# Patient Record
Sex: MALE | Race: WHITE | Employment: OTHER | URBAN - METROPOLITAN AREA
[De-identification: names, ages, dates, MRNs, and addresses within clinical notes are randomized per-mention and may not be internally consistent; named-entity substitution may affect disease eponyms.]

---

## 2017-04-19 ENCOUNTER — OFFICE VISIT (OUTPATIENT)
Dept: UROLOGY | Facility: CLINIC | Age: 37
End: 2017-04-19

## 2017-04-19 VITALS
OXYGEN SATURATION: 99 % | SYSTOLIC BLOOD PRESSURE: 117 MMHG | WEIGHT: 148 LBS | DIASTOLIC BLOOD PRESSURE: 72 MMHG | HEART RATE: 86 BPM | TEMPERATURE: 96.9 F | BODY MASS INDEX: 23.89 KG/M2

## 2017-04-19 DIAGNOSIS — N41.0 PROSTATITIS, ACUTE: ICD-10-CM

## 2017-04-19 DIAGNOSIS — R10.2 PERINEAL PAIN: Primary | ICD-10-CM

## 2017-04-19 DIAGNOSIS — K59.00 CONSTIPATION, UNSPECIFIED CONSTIPATION TYPE: ICD-10-CM

## 2017-04-19 DIAGNOSIS — R30.0 DYSURIA: ICD-10-CM

## 2017-04-19 DIAGNOSIS — R39.15 URINARY URGENCY: ICD-10-CM

## 2017-04-19 LAB
ALBUMIN UR-MCNC: NEGATIVE MG/DL
APPEARANCE UR: CLEAR
BILIRUB UR QL STRIP: NEGATIVE
COLOR UR AUTO: NORMAL
GLUCOSE UR STRIP-MCNC: NEGATIVE MG/DL
HGB UR QL STRIP: NEGATIVE
KETONES UR STRIP-MCNC: NEGATIVE MG/DL
LEUKOCYTE ESTERASE UR QL STRIP: NEGATIVE
NITRATE UR QL: NEGATIVE
PH UR STRIP: 6.5 PH (ref 5–7)
SP GR UR STRIP: 1 (ref 1–1.03)
URN SPEC COLLECT METH UR: NORMAL
UROBILINOGEN UR STRIP-MCNC: NORMAL MG/DL (ref 0–2)

## 2017-04-19 PROCEDURE — 51798 US URINE CAPACITY MEASURE: CPT | Performed by: PHYSICIAN ASSISTANT

## 2017-04-19 PROCEDURE — 99204 OFFICE O/P NEW MOD 45 MIN: CPT | Mod: 25 | Performed by: PHYSICIAN ASSISTANT

## 2017-04-19 PROCEDURE — 81003 URINALYSIS AUTO W/O SCOPE: CPT | Performed by: PHYSICIAN ASSISTANT

## 2017-04-19 RX ORDER — DOXYCYCLINE 100 MG/1
100 CAPSULE ORAL 2 TIMES DAILY
Qty: 28 CAPSULE | Refills: 1 | Status: SHIPPED | OUTPATIENT
Start: 2017-04-19 | End: 2017-06-21

## 2017-04-19 ASSESSMENT — PAIN SCALES - GENERAL: PAINLEVEL: NO PAIN (0)

## 2017-04-19 NOTE — MR AVS SNAPSHOT
After Visit Summary   4/19/2017    Atilio Wiley    MRN: [id number]           Patient Information     Date Of Birth          1980        Visit Information        Provider Department      4/19/2017 3:30 PM Shira Pack PA-C [facility]        Today's Diagnoses     Urinary urgency    -  1    Dysuria        Prostatitis, acute          Care Instructions    1. Your exam today is consistent with prostatitis (inflammation or infection in the prostate gland) which is the most likely explanation for the symptoms you are having.  2. Start taking the antibiotic (Doxycycline) to treat this. Take it twice a day for 2 weeks. If your symptoms are not better after 2 weeks,  the refill at the pharmacy and take it for another 2 weeks.  3. Ibuprofen (Advil) can also be helpful as it helps with both pain and inflammation. Follow the instructions on the bottle for dosing. This can be purchased over the counter.  4. Continue to take your probiotic or eat a yogurt once daily to protect your healthy intestinal bacteria.  5. We will check your urine for gonorrhea and chlamydia today. Will call in 1-2 days with results.   6. Sitting in a tub of warm water for 10-15 minutes a few times a week can also soothe pain and irritation from prostatitis.    Call or return to clinic if things do not improve with antibiotics.  [phone]          Follow-ups after your visit        Who to contact     If you have questions or need follow up information about today's clinic visit or your schedule please contact [facility] directly at [phone].  Normal or non-critical lab and imaging results will be communicated to you by MyChart, letter or phone within 4 business days after the clinic has received the results. If you do not hear from us within 7 days, please contact the clinic through MyChart or phone. If you have a critical or abnormal lab result, we will notify you by phone as soon  as possible.  Submit refill requests through [employer] or call your pharmacy and they will forward the refill request to us. Please allow 3 business days for your refill to be completed.          Additional Information About Your Visit        [employer] Information     [employer] is an electronic gateway that provides easy, online access to your medical records. With [employer], you can request a clinic appointment, read your test results, renew a prescription or communicate with your care team.     To sign up for [employer] visit the website at [email or web address]   You will be asked to enter the access code listed below, as well as some personal information. Please follow the directions to create your username and password.     Your access code is: [email or web address]  Expires: 2017  4:15 PM     Your access code will  in 90 days. If you need help or a new code, please contact your [facility] or call [phone] for assistance.        Care EveryWhere ID     This is your Care EveryWhere ID. This could be used by other organizations to access your [place] medical records  [id number]        Your Vitals Were     Pulse Temperature Pulse Oximetry BMI (Body Mass Index)          86 96.9  F (36.1  C) (Oral) 99% 23.89 kg/m2         Blood Pressure from Last 3 Encounters:   17 117/72   09 (!) 86/54   08 96/51    Weight from Last 3 Encounters:   17 67.1 kg (148 lb)   09 70 kg (154 lb 6.4 oz)   08 67.1 kg (148 lb)              We Performed the Following     CHLAMYDIA TRACHOMATIS PCR     MEASURE POST-VOID RESIDUAL URINE/BLADDER CAPACITY, US NON-IMAGING     NEISSERIA GONORRHOEA PCR     UA reflex to Microscopic and Culture          Today's Medication Changes          These changes are accurate as of: 17  4:15 PM.  If you have any questions, ask your nurse or doctor.               Start taking these medicines.        Dose/Directions    doxycycline 100 MG  capsule   Commonly known as:  VIBRAMYCIN   Used for:  Prostatitis, acute   Started by:  Shira Pack PA-C        Dose:  100 mg   Take 1 capsule (100 mg) by mouth 2 times daily for 14 days   Quantity:  28 capsule   Refills:  1            Where to get your medicines      These medications were sent to [facility] [address] [facility] - [place], [place] - [address] [place] [address] [address]  [address] [facility] [address]     Phone:  [phone]     doxycycline 100 MG capsule                Primary Care Provider Office Phone # Fax #    DAVID Monahan [facility] [phone]       [facility] [address] [facility] [address]        Thank you!     Thank you for choosing [facility]  for your care. Our goal is always to provide you with excellent care. Hearing back from our patients is one way we can continue to improve our services. Please take a few minutes to complete the written survey that you may receive in the mail after your visit with us. Thank you!             Your Updated Medication List - Protect others around you: Learn how to safely use, store and throw away your medicines at www.disposemymeds.org.          This list is accurate as of: 4/19/17  4:15 PM.  Always use your most recent med list.                   Brand Name Dispense Instructions for use    doxycycline 100 MG capsule    VIBRAMYCIN    28 capsule    Take 1 capsule (100 mg) by mouth 2 times daily for 14 days       MULTIVITAMIN ADULT PO          ZANTAC PO      None Entered

## 2017-04-19 NOTE — PATIENT INSTRUCTIONS
1. Your exam today is consistent with prostatitis (inflammation or infection in the prostate gland) which is the most likely explanation for the symptoms you are having.  2. Start taking the antibiotic (Doxycycline) to treat this. Take it twice a day for 2 weeks. If your symptoms are not better after 2 weeks,  the refill at the pharmacy and take it for another 2 weeks.  3. Ibuprofen (Advil) can also be helpful as it helps with both pain and inflammation. Follow the instructions on the bottle for dosing. This can be purchased over the counter.  4. Continue to take your probiotic or eat a yogurt once daily to protect your healthy intestinal bacteria.  5. We will check your urine for gonorrhea and chlamydia today. Will call in 1-2 days with results.   6. Sitting in a tub of warm water for 10-15 minutes a few times a week can also soothe pain and irritation from prostatitis.    Call or return to clinic if things do not improve with antibiotics.  [phone]

## 2017-04-19 NOTE — PROGRESS NOTES
"CC: perineal pain    HPI: It is a pleasure to see . Atilio Wiley, a very pleasant 36 year old male who presents via self-referral for evaluation of the above. Symptoms have been ongoing for several weeks-months and are progressively worsening. His main complaints include pain/pressure in the perineum and rectal area, discomfort with urination, and some hesitancy. Voids q1-2 hours during the day, nocturia x 1-2. Occasionally, he will have to void again 5-10 minutes later.  He also reports mild urgency and intermittent dysuria. Denies gross hematuria, pyuria, penile discharge, incontinence, intermittency, fevers, chills, N/V, abdominal/back pain, history of UTI's or kidney stones. No new sexual partners but would like to be checked for STD's.     Per chart review in Care Everywhere, patient has a long history of intermittent urinary retention and obstructive voiding symptoms. He was seen by [facility] previously. Reports having a cystoscopy 1 year ago which was \"normal.\" Cannot locate any urology records. The patient does have issues with severe constipation - ongoing since childhood. Sees GI and had a colonoscopy recently with no obvious cause. Also reports he has to catheterize himself about once a year when his urinary system \"gets clogged.\" He did this within the past week - the catheter passes without any difficulty. Has no major neurological or balance issues. No personal or family history of prostate issues. He denies previous groin injury or trauma. No h/o STD's.       Past Medical History:   Diagnosis Date     A-fib (H)      Deviated nasal septum     left sided maxillary facial pain     Past Surgical History:   Procedure Laterality Date     SINUS SURGERY       Current Outpatient Prescriptions   Medication Sig Dispense Refill     Multiple Vitamins-Minerals (MULTIVITAMIN ADULT PO)        doxycycline (VIBRAMYCIN) 100 MG capsule Take 1 capsule (100 mg) by mouth 2 times daily for 14 days 28 capsule 1 "     ZANTAC OR None Entered       No Known Allergies  FAMILY HISTORY: The patient denies history of genitourinary carcinoma.  There is no history of urolithiasis.    SOCIAL HISTORY: The patient does not smoke cigarettes, no EtOH and no illicit drug use.    ROS: A comprehensive 14 point ROS was obtained and was positive for a fib, back pain, constipation, deviated septum and otherwise negative except for that outlined above in the HPI.    PHYSICAL EXAM:   Vitals:    04/19/17 1537   BP: 117/72   BP Location: Left arm   Patient Position: Chair   Cuff Size: Adult Regular   Pulse: 86   Temp: 96.9  F (36.1  C)   TempSrc: Oral   SpO2: 99%   Weight: 67.1 kg (148 lb)     GENERAL: Well groomed/well developed/well nourished male in NAD.  HEENT: EOMI, AT, NC.  SKIN: Warm to touch, dry.  No visible rashes or lesions.  RESP: No increased respiratory effort.    MS: Full ROM in extremities.  : Deferred.  VICKEY:     Good sphincter tone, smooth rectal mucosa.     Mildly enlarged prostate that is smooth to palpation without nodules, mass, or asymmetry. Moderate tenderness and some bogginess to palpation.   NEURO: Alert and oriented x 3.  PSYCH: Normal mood and affect, pleasant and agreeable during interview and exam.    PVR: 0 mL as measured by ultrasound    Urine dip today completely negative.     IMAGING:   CT A/P w/contrast   5/19/16   (per records in Care Everywhere)  Impression:  Moderate amount of stool in the colon.  No other significant findings.    ASSESSMENT/PLAN:  Mr. Atilio Wiley is a 36 year old male with chronic constipation, intermittent urinary retention  (self caths about once per year), and perineal pain/pressue with some obstructive voiding symptoms which suspect are secondary to acute prostatitis. VICKEY today reveals a quite tender and slightly boggy prostate gland. Patient has reportedly had cystoscopy with [facility] within the past year which was normal. No records available - would be helpful to have  records faxed as he is wishing to transfer his urology care.  -Will treat for presumed prostatitis with 2 weeks of Doxycycline. 1 refill available if symptoms not completely resolved.  - Encouraged warm Sitz baths, NSAIDS PRN, and avoidance of aggravating activities and known bladder irritants.   - Stressed importance of preventing constipation as this can worsen urinary retention and obstructive symptoms. Increase fluids, fiber, and use Miralax or stool softeners PRN.  - Instructed to take a daily probiotic or eat yogurt to prevent disruption to normal GI bee while taking antibiotics.  - Will also send urine for gonorrhea, chlamydia today    Follow up if symptoms do not improve with the above. Urethral stricture remains on the differential - could consider RUG/VCUG to further evaluate. Warning signs/ER precautions discussed.     I have enjoyed participating in the medical care of this very pleasant patient.  Please don't hesitate to contact me with any questions or concerns.     Shira Pack PA-C  Department of Urology

## 2017-04-19 NOTE — NURSING NOTE
"Atilio Wiley's goals for this visit include: discuss urinary urgency and frequency prevention  He requests these members of his care team be copied on today's visit information:     PCP: Chana Poon    Referring Provider:  Referred Self, MD  No address on file    Chief Complaint   Patient presents with     Urinary Problem     Urgency/frequency       Initial /72 (BP Location: Left arm, Patient Position: Chair, Cuff Size: Adult Regular)  Pulse 86  Temp 96.9  F (36.1  C) (Oral)  Wt 67.1 kg (148 lb)  SpO2 99%  BMI 23.89 kg/m2 Estimated body mass index is 23.89 kg/(m^2) as calculated from the following:    Height as of 2/19/09: 1.676 m (5' 6\").    Weight as of this encounter: 67.1 kg (148 lb).  Medication Reconciliation: complete    Do you need any medication refills at today's visit? No    Minna Oneill LPN    "

## 2017-04-20 ENCOUNTER — TELEPHONE (OUTPATIENT)
Dept: UROLOGY | Facility: CLINIC | Age: 37
End: 2017-04-20

## 2017-04-20 NOTE — TELEPHONE ENCOUNTER
Discussed labs with Shira Pack PA-C who reports that patient is being treated with doxycycline for presumed prostatitis and gonorrhea and chlamydia labs can be cancelled.     Kaylah Roman  General Surgery - Urology RN

## 2017-04-20 NOTE — TELEPHONE ENCOUNTER
"Called and spoke to lab who reports that the lab order for gonnorhea and chlamydia shows that it is completed, but the sample was not sent as a dirty catch urine is needed.     Called and spoke to patient who is aware of the above information. Informed patient that it is recommended for him to leave a dirty catch urine sample at any [place] lab at his earliest convenience. Patient stated, \"I wish they would have told me that before I left.\" Offered to fax lab orders to a clinic near patient, but patient declined. Patients stated, \"Shira told me that the antibiotic would cover it if it ended up being positive. I will just skip those two labs and save some money.\" Patient plans to take the doxycycline as prescribed. Informed patient that writer would discuss with Shira Pack PA-C and contact him with any changes in plan of care. Patient will call with any questions or concerns.    Kaylah Roman  General Surgery - Urology RN    "

## 2017-05-03 ENCOUNTER — OFFICE VISIT (OUTPATIENT)
Dept: PEDIATRICS | Facility: CLINIC | Age: 37
End: 2017-05-03

## 2017-05-03 VITALS
HEIGHT: 67 IN | BODY MASS INDEX: 22.62 KG/M2 | SYSTOLIC BLOOD PRESSURE: 119 MMHG | TEMPERATURE: 98.2 F | OXYGEN SATURATION: 99 % | HEART RATE: 69 BPM | WEIGHT: 144.1 LBS | DIASTOLIC BLOOD PRESSURE: 65 MMHG

## 2017-05-03 DIAGNOSIS — K21.9 GASTROESOPHAGEAL REFLUX DISEASE WITHOUT ESOPHAGITIS: Primary | ICD-10-CM

## 2017-05-03 PROCEDURE — 99213 OFFICE O/P EST LOW 20 MIN: CPT | Performed by: INTERNAL MEDICINE

## 2017-05-03 NOTE — PROGRESS NOTES
"  SUBJECTIVE:                                                    Atilio Wiley is a 36 year old male who presents to clinic today for the following health issues:      Pt. Is present today for stomach pain. Pt. States this has been ongoing for a while.  Pt. States he always has stomach pain but has a concern for a artery in stomach.  Pt. States he would also like to discuss acid reflex concerns. Pt. Has had acid problems x 10 years.    HPI: This patient recently had a cardioversion completed for atrial fibrillation. Also has recently been started on doxycycline for prostatitis. Has chronic problems with GERD which he says is not ideally controlled on Zantac    PMH:   Patient Active Problem List   Diagnosis     A-fib (H)     CARDIOVASCULAR SCREENING; LDL GOAL LESS THAN 160     Past Surgical History:   Procedure Laterality Date     SINUS SURGERY         Social History   Substance Use Topics     Smoking status: Never Smoker     Smokeless tobacco: Not on file     Alcohol use No     History reviewed. No pertinent family history.      Current Outpatient Prescriptions   Medication Sig Dispense Refill     doxycycline (VIBRAMYCIN) 100 MG capsule Take 1 capsule (100 mg) by mouth 2 times daily for 14 days 28 capsule 1     Multiple Vitamins-Minerals (MULTIVITAMIN ADULT PO) Reported on 5/3/2017       ZANTAC OR Reported on 5/3/2017       No Known Allergies    ROS:  C: NEGATIVE for fever, chills, change in weight  R: NEGATIVE for significant cough or SOB  CV: NEGATIVE for chest pain or peripheral edema    OBJECTIVE:                                                    /65 (BP Location: Right arm, Patient Position: Chair, Cuff Size: Adult Regular)  Pulse 69  Temp 98.2  F (36.8  C) (Temporal)  Ht 5' 6.95\" (1.701 m)  Wt 144 lb 1.6 oz (65.4 kg)  SpO2 99%  BMI 22.6 kg/m2  Body mass index is 22.6 kg/(m^2).     GENERAL: healthy, alert and no distress  RESP: lungs clear to auscultation - no rales, rhonchi or wheezes  CV: regular " rates and rhythm, normal S1 S2, no S3 or S4 and no murmur, click or rub  ABDOMEN: soft, nontender, no hepatosplenomegaly, no masses and bowel sounds normal    Diagnostic Test Results:  none      ASSESSMENT/PLAN:                                                    1. Gastroesophageal reflux disease without esophagitis      Stop the Zantac and start the Prilosec. He has an as needed to follow up with urology for prostatitis and sees a cardiologist next week for follow-up of his cardioversion.      - omeprazole (PRILOSEC) 20 MG CR capsule; Take 1 capsule (20 mg) by mouth daily  Dispense: 30 capsule; Refill: 1        Will call or return to clinic if worsening or symptoms not improving as discussed.  See Patient Instructions      Eros Romero MD  [facility]

## 2017-05-03 NOTE — MR AVS SNAPSHOT
After Visit Summary   5/3/2017    Atilio Wiley    MRN: [id number]           Patient Information     Date Of Birth          1980        Visit Information        Provider Department      5/3/2017 2:40 PM Eros Romero MD [facility]        Today's Diagnoses     Gastroesophageal reflux disease without esophagitis    -  1      Care Instructions    Stop the Zantac  Start OMEPRAZOLE  one every am one half hour before breakfast.  Tips to Control Acid Reflux  To control acid reflux, you ll need to make some basic diet and lifestyle changes. The simple steps outlined below may be all you ll need to relieve discomfort.  Watch What You Eat      Avoid fatty foods and spicy foods.    Eat fewer acidic foods, such as citrus and tomato-based foods. These can increase symptoms.    Limit drinking alcohol, caffeine, and fizzy beverages. All increase acid reflux.    Try limiting chocolate, peppermint, and spearmint. These can worsen acid reflux in some people.  Watch When You Eat    Avoid lying down for 3 hours after eating.    Do not snack before going to bed.  Raise Your Head    Raising your head and upper body by 4 inches to 6 inches helps limit reflux when you re lying down. Put blocks under the head of the bed frame to raise it.  Other Changes    Lose weight, if you need to.    Don t work out near bedtime.    Avoid tight-fitting clothes.    Limit aspirin and ibuprofen.    Stop smoking.     2878-9271 The "[employer]". [address] [place] [address], [place] [id number]. All rights reserved. This information is not intended as a substitute for professional medical care. Always follow your healthcare professional's instructions.        Medications for Acid Reflux  Your health care provider has told you that you have acid reflux. This is a condition that causes stomach acid to wash up into your throat. For most people, acid reflux is troubling but not dangerous. However, left untreated, acid  reflux sometimes damages the esophagus. Medications can help control acid reflux and limit your risk of future problems.  Medications for Acid Reflux  Your health care provider may prescribe medication to help treat your acid reflux. Medication will be based on your symptoms and the results of any tests. Your health care provider will explain how to take your medication. You will also be told about possible side effects.  Reducing Stomach Acid  Your doctor may suggest antacids that you can buy over the counter. Antacids can give fast relief. Or you may be told to take a type of medication called H2 blockers. These are available over the counter and by prescription (for higher doses).  Blocking Stomach Acid  In more severe cases, your doctor may suggest stronger medications such as proton pump inhibitors (PPIs). These keep the stomach from making acid. They are often prescribed for long-term use.  Other Medications  If medications to reduce or block stomach acid don t work, you may be switched to another type of medication that helps the stomach empty better.    4038-4820 The [employer]. [address] [place] [address]. All rights reserved. This information is not intended as a substitute for professional medical care. Always follow your healthcare professional's instructions.              Follow-ups after your visit        Who to contact     If you have questions or need follow up information about today's clinic visit or your schedule please contact [facility] directly at [phone].  Normal or non-critical lab and imaging results will be communicated to you by MyChart, letter or phone within 4 business days after the clinic has received the results. If you do not hear from us within 7 days, please contact the clinic through MyChart or phone. If you have a critical or abnormal lab result, we will notify you by phone as soon as possible.  Submit refill requests through  "[employer] or call your pharmacy and they will forward the refill request to us. Please allow 3 business days for your refill to be completed.          Additional Information About Your Visit        [employer] Information     [employer] is an electronic gateway that provides easy, online access to your medical records. With [employer], you can request a clinic appointment, read your test results, renew a prescription or communicate with your care team.     To sign up for [employer] visit the website at [email or web address]   You will be asked to enter the access code listed below, as well as some personal information. Please follow the directions to create your username and password.     Your access code is: [email or web address]  Expires: 2017  4:15 PM     Your access code will  in 90 days. If you need help or a new code, please contact your [facility] or call [phone] for assistance.        Care EveryWhere ID     This is your Care EveryWhere ID. This could be used by other organizations to access your [place] medical records  [id number]        Your Vitals Were     Pulse Temperature Height Pulse Oximetry BMI (Body Mass Index)       69 98.2  F (36.8  C) (Temporal) 5' 6.95\" (1.701 m) 99% 22.6 kg/m2        Blood Pressure from Last 3 Encounters:   17 119/65   17 117/72   09 (!) 86/54    Weight from Last 3 Encounters:   17 144 lb 1.6 oz (65.4 kg)   17 148 lb (67.1 kg)   09 154 lb 6.4 oz (70 kg)              Today, you had the following     No orders found for display         Today's Medication Changes          These changes are accurate as of: 5/3/17  3:00 PM.  If you have any questions, ask your nurse or doctor.               Start taking these medicines.        Dose/Directions    omeprazole 20 MG CR capsule   Commonly known as:  priLOSEC   Used for:  Gastroesophageal reflux disease without esophagitis   Started by:  Eros Romero MD        " Dose:  20 mg   Take 1 capsule (20 mg) by mouth daily   Quantity:  30 capsule   Refills:  1            Where to get your medicines      These medications were sent to [place] [address] [facility] - [place], [place] - [address] [facility] [address]  [address] [place] [address] [facility] [address]     Phone:  [phone]     omeprazole 20 MG CR capsule                Primary Care Provider Office Phone # Fax #    Chana TAVERAS DAVID Poon [facility] [phone]       [facility] [address] [facility] [address]        Thank you!     Thank you for choosing [facility]  for your care. Our goal is always to provide you with excellent care. Hearing back from our patients is one way we can continue to improve our services. Please take a few minutes to complete the written survey that you may receive in the mail after your visit with us. Thank you!             Your Updated Medication List - Protect others around you: Learn how to safely use, store and throw away your medicines at www.disposemymeds.org.          This list is accurate as of: 5/3/17  3:00 PM.  Always use your most recent med list.                   Brand Name Dispense Instructions for use    doxycycline 100 MG capsule    VIBRAMYCIN    28 capsule    Take 1 capsule (100 mg) by mouth 2 times daily for 14 days       MULTIVITAMIN ADULT PO      Reported on 5/3/2017       omeprazole 20 MG CR capsule    priLOSEC    30 capsule    Take 1 capsule (20 mg) by mouth daily       ZANTAC PO      Reported on 5/3/2017

## 2017-05-03 NOTE — PATIENT INSTRUCTIONS
Stop the Zantac  Start OMEPRAZOLE  one every am one half hour before breakfast.  Tips to Control Acid Reflux  To control acid reflux, you ll need to make some basic diet and lifestyle changes. The simple steps outlined below may be all you ll need to relieve discomfort.  Watch What You Eat      Avoid fatty foods and spicy foods.    Eat fewer acidic foods, such as citrus and tomato-based foods. These can increase symptoms.    Limit drinking alcohol, caffeine, and fizzy beverages. All increase acid reflux.    Try limiting chocolate, peppermint, and spearmint. These can worsen acid reflux in some people.  Watch When You Eat    Avoid lying down for 3 hours after eating.    Do not snack before going to bed.  Raise Your Head    Raising your head and upper body by 4 inches to 6 inches helps limit reflux when you re lying down. Put blocks under the head of the bed frame to raise it.  Other Changes    Lose weight, if you need to.    Don t work out near bedtime.    Avoid tight-fitting clothes.    Limit aspirin and ibuprofen.    Stop smoking.     9178-1672 [employer]. [address] [place] [address]. All rights reserved. This information is not intended as a substitute for professional medical care. Always follow your healthcare professional's instructions.        Medications for Acid Reflux  Your health care provider has told you that you have acid reflux. This is a condition that causes stomach acid to wash up into your throat. For most people, acid reflux is troubling but not dangerous. However, left untreated, acid reflux sometimes damages the esophagus. Medications can help control acid reflux and limit your risk of future problems.  Medications for Acid Reflux  Your health care provider may prescribe medication to help treat your acid reflux. Medication will be based on your symptoms and the results of any tests. Your health care provider will explain how to take your medication. You will also be  told about possible side effects.  Reducing Stomach Acid  Your doctor may suggest antacids that you can buy over the counter. Antacids can give fast relief. Or you may be told to take a type of medication called H2 blockers. These are available over the counter and by prescription (for higher doses).  Blocking Stomach Acid  In more severe cases, your doctor may suggest stronger medications such as proton pump inhibitors (PPIs). These keep the stomach from making acid. They are often prescribed for long-term use.  Other Medications  If medications to reduce or block stomach acid don t work, you may be switched to another type of medication that helps the stomach empty better.    8129-4628 [employer]. [address] [place] [address], [place] [address]. All rights reserved. This information is not intended as a substitute for professional medical care. Always follow your healthcare professional's instructions.

## 2017-06-20 ENCOUNTER — TELEPHONE (OUTPATIENT)
Dept: UROLOGY | Facility: CLINIC | Age: 37
End: 2017-06-20

## 2017-06-20 DIAGNOSIS — N41.0 PROSTATITIS, ACUTE: ICD-10-CM

## 2017-06-20 NOTE — TELEPHONE ENCOUNTER
[facility] Call Center    Phone Message    Name of Caller: Atilio    Phone Number: Cell number on file:    Telephone Information:   Mobile [phone]       Best time to return call: ANY    May a detailed message be left on voicemail: yes    Relation to patient: Self    Reason for Call: Medication that was ordered for him worked well, but symptoms are back now, wants to know next step. Please call to discuss    Action Taken: Message routed to:  Adult Clinics: Urology p [id number]

## 2017-06-21 RX ORDER — DOXYCYCLINE 100 MG/1
100 CAPSULE ORAL 2 TIMES DAILY
Qty: 60 CAPSULE | Refills: 0 | Status: SHIPPED | OUTPATIENT
Start: 2017-06-21 | End: 2017-07-21

## 2017-06-21 NOTE — TELEPHONE ENCOUNTER
"Returned call to patient who reports that he completed 1 months worth of doxycycline medication. Patient reports that the urinary frequency has improved. Patient stated, \"The prostate pain improved while I was on the antibiotics, but now after being off of them for the past month, the pain is back the same.\" Informed patient that a message would be sent to Shira Pack PA-C to review and give recommendations. Informed patient that he would be contacted with recommendations. Patient verbalized understanding and was comfortable with plan.    Kaylah Roman RN, BSN  [facility]  Urology/General Surgery    "

## 2017-06-21 NOTE — TELEPHONE ENCOUNTER
Will try one more course of Doxycycline as this improved symptoms previously - possible that treatment duration was not long enough. If not improved after second course, patient needs to follow up in clinic.    Shira Pack PA-C  [facility] Urology

## 2017-06-21 NOTE — TELEPHONE ENCOUNTER
"Received message from Shira Pack PA-C with new order for doxycycline 100 mg BID for 1 month. Per Shira Pack PA-C, patient to return to clinic if symptoms have not improved after that.    Called and spoke to patient who is aware of the above information. Doxycycline 100 mg BID for 1 month ordered per Shira Pack PA-C. Prescription sent to [facility] in [facility] in [place] per patient request. Patient stated, \"When I was taking the doxycycline before, I was also taking a few other new medications and was working outside with brush, but I ended up getting a rash on my hands. Could that be from the doxycycline? I am just trying to narrow it down.\" Informed patient that the rash could be from multiple factors such as medications or from contact. Patient reports that the rash went away soon after and patient was taking the doxycycline at that time and after. Informed patient to call if rash appears again after resuming the doxycycline. Patient verbalized understanding.    Kaylah Roman RN, BSN  [facility]  Urology/General Surgery      "

## 2017-10-30 ENCOUNTER — TELEPHONE (OUTPATIENT)
Dept: UROLOGY | Facility: CLINIC | Age: 37
End: 2017-10-30

## 2017-10-30 NOTE — TELEPHONE ENCOUNTER
Called and spoke to patient regarding message below. Informed patient that per notes, plan was for antibiotics and the return to clinic if symptoms have not improved (see 6/20/17 telephone encounter.) Patient would like to schedule a follow up visit. Appointment with Shira Pack PA-C scheduled for 11/10/17 at 4:00pm.     Kaylah Roman RN, BSN

## 2017-11-10 ENCOUNTER — OFFICE VISIT (OUTPATIENT)
Dept: UROLOGY | Facility: CLINIC | Age: 37
End: 2017-11-10

## 2017-11-10 VITALS
WEIGHT: 149.5 LBS | OXYGEN SATURATION: 99 % | BODY MASS INDEX: 23.45 KG/M2 | SYSTOLIC BLOOD PRESSURE: 96 MMHG | HEART RATE: 55 BPM | TEMPERATURE: 96.7 F | DIASTOLIC BLOOD PRESSURE: 61 MMHG

## 2017-11-10 DIAGNOSIS — R39.11 URINARY HESITANCY: ICD-10-CM

## 2017-11-10 DIAGNOSIS — K62.89 RECTAL PAIN: Primary | ICD-10-CM

## 2017-11-10 DIAGNOSIS — R10.2 PERINEAL PAIN: ICD-10-CM

## 2017-11-10 DIAGNOSIS — K59.00 CONSTIPATION, UNSPECIFIED CONSTIPATION TYPE: ICD-10-CM

## 2017-11-10 PROCEDURE — 99214 OFFICE O/P EST MOD 30 MIN: CPT | Performed by: PHYSICIAN ASSISTANT

## 2017-11-10 NOTE — MR AVS SNAPSHOT
After Visit Summary   11/10/2017    Atilio Wiley    MRN: [id number]           Patient Information     Date Of Birth          1980        Visit Information        Provider Department      11/10/2017 4:00 PM Shira Pack PA-C [facility]        Today's Diagnoses     Rectal pain    -  1       Follow-ups after your visit        Additional Services     COLORECTAL SURGERY REFERRAL       Your provider has referred you to: [facility]: Colon and Rectal Surgery [facility] [phone]   [email or web address]    Referral Reason(s): Consult  Special Concerns:  This referral is: Elective (week +)  It is OK to leave a message on patient's voicemail.    Please be aware that coverage of these services is subject to the terms and limitations of your health insurance plan.  Call member services at your health plan with any benefit or coverage questions.      Please bring the following with you to your appointment:    (1) Any X-Rays, CTs or MRIs which have been performed.  Contact the facility where they were done to arrange for  prior to your scheduled appointment.    (2) List of current medications  (3) This referral request   (4) Any documents/labs given to you for this referral                  Your next 10 appointments already scheduled     Nov 21, 2017  2:45 PM CST   (Arrive by 2:30 PM)   New Patient Visit with DAVID Francis CNP   [facility] Colon and Rectal Surgery ([facility])    [address] [place] [id number] [phone]              Who to contact     If you have questions or need follow up information about today's clinic visit or your schedule please contact [facility] directly at [phone].  Normal or non-critical lab and imaging results will be communicated to you by MyChart, letter or phone within 4 business days after the  clinic has received the results. If you do not hear from us within 7 days, please contact the clinic through [employer] or phone. If you have a critical or abnormal lab result, we will notify you by phone as soon as possible.  Submit refill requests through [employer] or call your pharmacy and they will forward the refill request to us. Please allow 3 business days for your refill to be completed.          Additional Information About Your Visit        [employer] Information     [employer] is an electronic gateway that provides easy, online access to your medical records. With [employer], you can request a clinic appointment, read your test results, renew a prescription or communicate with your care team.     To sign up for [employer] visit the website at [email or web address]   You will be asked to enter the access code listed below, as well as some personal information. Please follow the directions to create your username and password.     Your access code is: [email or web address]  Expires: 2018  4:38 PM     Your access code will  in 90 days. If you need help or a new code, please contact your [facility] or call [phone] for assistance.        Care EveryWhere ID     This is your Care EveryWhere ID. This could be used by other organizations to access your [place] medical records  [id number]        Your Vitals Were     Pulse Temperature Pulse Oximetry BMI (Body Mass Index)          55 96.7  F (35.9  C) (Oral) 99% 23.45 kg/m2         Blood Pressure from Last 3 Encounters:   11/10/17 96/61   17 119/65   17 117/72    Weight from Last 3 Encounters:   11/10/17 67.8 kg (149 lb 8 oz)   17 65.4 kg (144 lb 1.6 oz)   17 67.1 kg (148 lb)              We Performed the Following     COLORECTAL SURGERY REFERRAL        Primary Care Provider Office Phone # Fax #    DAVID Monahan [facility] [phone] [address]  [facility] [address]        Equal Access to  Services     [facility]: Hadii aad ku hadbethanyjosh Orozco, waedieda luqadaha, qaybta kaalmaabbi valle, delisa borjas. So [facility] [phone].    ATENCIÓN: Si habla español, tiene a farias disposición servicios gratuitos de asistencia lingüística. Llame al [phone].    We comply with applicable federal civil rights laws and [place] laws. We do not discriminate on the basis of race, color, national origin, age, disability, sex, sexual orientation, or gender identity.            Thank you!     Thank you for choosing [facility]  for your care. Our goal is always to provide you with excellent care. Hearing back from our patients is one way we can continue to improve our services. Please take a few minutes to complete the written survey that you may receive in the mail after your visit with us. Thank you!             Your Updated Medication List - Protect others around you: Learn how to safely use, store and throw away your medicines at www.disposemymeds.org.          This list is accurate as of: 11/10/17  4:38 PM.  Always use your most recent med list.                   Brand Name Dispense Instructions for use Diagnosis    MULTIVITAMIN ADULT PO      Reported on 5/3/2017        omeprazole 20 MG CR capsule    priLOSEC    30 capsule    Take 1 capsule (20 mg) by mouth daily    Gastroesophageal reflux disease without esophagitis       ZANTAC PO      Reported on 5/3/2017

## 2017-11-10 NOTE — NURSING NOTE
"Atilio Wiley's goals for this visit include:   Chief Complaint   Patient presents with     RECHECK     Perinial pain       He requests these members of his care team be copied on today's visit information: No    PCP: Chana Poon    Referring Provider:  No referring provider defined for this encounter.    Chief Complaint   Patient presents with     RECHECK     Perinial pain       Initial BP 96/61 (BP Location: Left arm, Patient Position: Sitting, Cuff Size: Adult Regular)  Pulse 55  Temp 96.7  F (35.9  C) (Oral)  Wt 67.8 kg (149 lb 8 oz)  SpO2 99%  BMI 23.45 kg/m2 Estimated body mass index is 23.45 kg/(m^2) as calculated from the following:    Height as of 5/3/17: 1.701 m (5' 6.95\").    Weight as of this encounter: 67.8 kg (149 lb 8 oz).  Medication Reconciliation: complete    Do you need any medication refills at today's visit? No    "

## 2017-11-12 NOTE — PROGRESS NOTES
UROLOGY OFFICE VISIT - FOLLOW UP    REASON FOR VISIT: follow up perineal/rectal pain    HPI: Mr. Atilio Wiley is a 37 year old male who returns to the urology clinic for follow up of perineal/rectal pain. Seen in initial consultation on 4/19/17 - please see consult note from this date for full history. In brief, he has a long history of rectal vs perineal pain/pressure as well as significant constipation and some obstructive voiding symptoms with intermittent urinary retention, ongoing for the past 10-15 years. [facility] with reportedly normal cystoscopy in 2016. Has also seen GI with recent normal colonoscopy. He apparently has to self catheterize about once per year when he goes into spontaneous urinary retention. Last time he had to do this was in April 2017.     VICKEY at last office visit had revealed some questionable tenderness of the prostate gland so he was treated for a possible prostatitis with a course of Doxycycline. Returns to clinic today and reports antibiotics did not help. His symptoms are essentially unchanged. His main complaints today include rectal pain and pain in in left lower abdomen. Last BM was earlier today. He states that after he has a BM, he will be unable to urinate for several minutes but then is eventually able to void again. He denies any dysuria, gross hematuria, pyuria, penile discharge, concern for STD's, hematochezia, melena, or h/o anal fissures.    PEx  BP 96/61 (BP Location: Left arm, Patient Position: Sitting, Cuff Size: Adult Regular)  Pulse 55  Temp 96.7  F (35.9  C) (Oral)  Wt 67.8 kg (149 lb 8 oz)  SpO2 99%  BMI 23.45 kg/m2  GEN: well-appearing male in NAD  RESP: no increased respiratory effort    PVR: 0 cc (measured at last office visit)    Color Urine (no units)   Date Value   04/19/2017 Light Yellow     Appearance Urine (no units)   Date Value   04/19/2017 Clear     Glucose Urine (mg/dL)   Date Value   04/19/2017 Negative     Bilirubin  Urine (no units)   Date Value   04/19/2017 Negative     Ketones Urine (mg/dL)   Date Value   04/19/2017 Negative     Specific Gravity Urine (no units)   Date Value   04/19/2017 1.005     pH Urine (pH)   Date Value   04/19/2017 6.5     Protein Albumin Urine (mg/dL)   Date Value   04/19/2017 Negative     Nitrite Urine (no units)   Date Value   04/19/2017 Negative     Leukocyte Esterase Urine (no units)   Date Value   04/19/2017 Negative       IMAGING:   CT A/P w/contrast   5/19/16   (per records in Care Everywhere)  Impression:  Moderate amount of stool in the colon.  No other significant findings.      ASSESSMENT/PLAN  37 year old male with chronic constipation, rectal vs perineal pain, and obstructive voiding symptoms with infrequent episodes of spontaneous urinary retention for which he self-catheterizes as needed (occurs maybe once per year).     Has had normal cystoscopy and colonoscopy within the past 1-2 years. Treatment for prostatitis did not improve/change symptoms. UA and PVR at last office visit were normal.     His main complaints today are more rectal-focused (pain and constipation) rather than urinary. Possible he has some pelvic floor dysfunction for which he may benefit from pelvic floor testing.     We discussed potential management options including trial of an alpha blocker medication such a tamsulosin, pelvic floor physical therapy, scheduling urodynamic studies, or referral to colorectal for consult regarding rectal pain/constipation.   -Patient does not currently have medical insurance so he is concerned about cost (refused uroflowmetry studies today). He would likely benefit from urodynamic studies but he declines at this time.  -He would like to proceed with colorectal consult given longstanding rectal pain/constipation. Referral placed.     Pending findings/recommendations from colorectal, patient instructed to call if his urinary symptoms do not improve. Would then plan to proceed with  urodynamic studies.    25 minutes spent with the patient, >50% in counseling and coordination of care.    Shira Pack PA-C  Department of Urology

## 2017-11-16 NOTE — TELEPHONE ENCOUNTER
APPT INFO    Date /Time: 11/21/17   Reason for Appt: Rectal Pain/Constipation   Ref Provider/Clinic: [facility]   Are there internal records? If yes, list: Yes  See Above   Patient Contact (Y/N) & Call Details: No referred   Action: Requested records from [facility]     OUTSIDE RECORDS CHECKLIST     CLINIC NAME COMMENTS REC (x) IMG (x)   [facility]  x x

## 2017-11-20 NOTE — TELEPHONE ENCOUNTER
Records Received From:  [facility]     DATE/EXAM/LOCATION  (specify location if different)   Office Notes:  2/27/15, 3/26/15, 2/10/16, 5/13/16, 5/17/16   ED/Hospital Notes: 5/12/16   Radiology Reports: - CT abdomen pelvis enterography 4/17/15  - CT abdomen pelvis 5/19/16

## 2017-11-21 ENCOUNTER — PRE VISIT (OUTPATIENT)
Dept: SURGERY | Facility: CLINIC | Age: 37
End: 2017-11-21

## 2017-11-21 ENCOUNTER — OFFICE VISIT (OUTPATIENT)
Dept: SURGERY | Facility: CLINIC | Age: 37
End: 2017-11-21

## 2017-11-21 VITALS
HEART RATE: 70 BPM | HEIGHT: 66 IN | DIASTOLIC BLOOD PRESSURE: 69 MMHG | SYSTOLIC BLOOD PRESSURE: 124 MMHG | OXYGEN SATURATION: 99 % | WEIGHT: 148.1 LBS | TEMPERATURE: 97.9 F | BODY MASS INDEX: 23.8 KG/M2

## 2017-11-21 DIAGNOSIS — K62.89 RECTAL PAIN: ICD-10-CM

## 2017-11-21 DIAGNOSIS — R15.0 INCOMPLETE DEFECATION: Primary | ICD-10-CM

## 2017-11-21 RX ORDER — SOTALOL HYDROCHLORIDE 80 MG/1
80 TABLET ORAL DAILY
COMMUNITY

## 2017-11-21 ASSESSMENT — PAIN SCALES - GENERAL: PAINLEVEL: MODERATE PAIN (4)

## 2017-11-21 NOTE — PROGRESS NOTES
"Colon and Rectal Surgery Consult Clinic Note    Date: 2017     Referring provider:  Shira Pack PA-C  [address] [place] [address]     RE: Atilio Wiley  : 1980  TYE: 2017    Atilio Wiley is a very pleasant 37 year old male without a significant past medical history with a recent diagnosis of rectal and abdominal pain.  Given these findings they were subsequently sent to the Colon and Rectal Surgery Clinic for an opinion on this and a new patient consultation.     Atilio presents today by himself but has his friend, Nydia, on speaker phone during the visit. He reports a long-standing dull, rectal pain. This is sometimes quite mild but is always present. He finds that it is worse when he is lying in bed at night. He has not noticed any interventions that have either make his symptoms worse or better. He thinks this has been present for many years. He reports that he never feels like he completely empties after a bowel movement and always feels \"plugged up\" in both his rectum and in his abdomen. When he feels \"backed up\" he also has difficulty with urination. He either voids frequently or is unable to void and has to occasionally self catheter. He has seen urology for this. He was treated for possible prostatitis which he thinks may have helped somewhat, but he is unsure. He has fairly constant left lower quadrant abdominal pain. He states that this is been present or about 10 years. He feels as though stool gets \"clogged\" on the left side of his abdomen. He rates this pain 5/10. The pain sometimes keeps him up at night. He has tried Maalox which is occasionally helpful. He underwent a colonoscopy with gastroenterology which was reportedly normal. He typically has one moderate sized bowel movement in the morning and then a few smaller bowel movements throughout the day. Even with multiple bowel movements a day, he feels as though he is not completely empty and feels constipated. " When asked if he is otherwise healthy, he states that he has pain that started in his face and has now moved to the rest of his body and is causing arthritis.    Assessment/Plan: 37 year old male with pelvic pain and incomplete evacuation. On exam he has internal hemorrhoids but exam is otherwise normal. I am unsure what is causing his sensation of incomplete evacuation and rectal pain. I recommended pelvic floor testing to further evaluate. I recommended trying daily Miralax to keep stools very soft and to avoid straining with bowel movements. Advised him to continue to follow with gastroenterology for long standing abdominal pain. Return to urology for continued difficulty with voiding as they have recommended urodynamic studies. Patient's questions were answered to his stated satisfaction and he is in agreement with this plan.    Medical history:  Past Medical History:   Diagnosis Date     A-fib (H)      Deviated nasal septum     left sided maxillary facial pain       Surgical history:  Past Surgical History:   Procedure Laterality Date     SINUS SURGERY         Problem list:  Patient Active Problem List    Diagnosis Date Noted     CARDIOVASCULAR SCREENING; LDL GOAL LESS THAN 160 10/31/2010     Priority: Medium     A-fib (H) 02/19/2009     Priority: Medium       Medications:  Current Outpatient Prescriptions   Medication Sig Dispense Refill     sotalol (BETAPACE) 80 MG tablet Take 80 mg by mouth 2 times daily       omeprazole (PRILOSEC) 20 MG CR capsule Take 1 capsule (20 mg) by mouth daily 30 capsule 1     ZANTAC OR Reported on 5/3/2017         Allergies:  No Known Allergies    Family history:  No family history on file.    Social history:  Social History   Substance Use Topics     Smoking status: Never Smoker     Smokeless tobacco: Never Used     Alcohol use No    Marital status: single.  Occupation:     Nursing Notes:   Wanda Rios LPN  11/21/2017  2:36 PM  Signed  Chief Complaint   Patient  "presents with     Clinic Care Coordination - Initial     Rectal pain       Vitals:    11/21/17 1431   BP: 124/69   Pulse: 70   Temp: 97.9  F (36.6  C)   SpO2: 99%   Weight: 148 lb 1.6 oz   Height: 5' 9.65\"       Body mass index is 21.46 kg/(m^2).    Cecilia SMITH CMA                           Physical Examination:  /69  Pulse 70  Temp 97.9  F (36.6  C)  Ht 5' 6\"  Wt 148 lb 1.6 oz  SpO2 99%  BMI 23.9 kg/m2  General: alert, oriented, in no acute distress, sitting comfortably  HEENT: mucous membranes moist  Perianal external examination:  Perianal skin: Intact with no excoriation or lichenification.  Lesions: No evidence of an external lesion, nodularity, or induration in the perianal region.  Eversion of buttocks: There was not evidence of an anal fissure. Details: N/A.  Skin tags or external hemorrhoids: None.  Digital rectal examination: Was performed.   Sphincter tone: somewhat hypertonic.  Palpable lesions: No.  Prostate: Normal.  Other: None.      Anoscopy: Was performed.   Hemorrhoids: Yes. Grade 2 internal hemorrhoids without bleeding  Lesions: No      Total face to face time was 30 minutes, >50% counseling.    DAVID Coy, NP-C  Colon and Rectal Surgery   [facility]    This note was created using speech recognition software and may contain unintended word substitutions.  "

## 2017-11-21 NOTE — NURSING NOTE
"Chief Complaint   Patient presents with     Clinic Care Coordination - Initial     Rectal pain       Vitals:    11/21/17 1431   BP: 124/69   Pulse: 70   Temp: 97.9  F (36.6  C)   SpO2: 99%   Weight: 148 lb 1.6 oz   Height: 5' 9.65\"       Body mass index is 21.46 kg/(m^2).    Cecilia SMITH CMA                        "

## 2017-11-21 NOTE — MR AVS SNAPSHOT
After Visit Summary   2017    Atilio Wiley    MRN: [id number]           Patient Information     Date Of Birth          1980        Visit Information        Provider Department      2017 3:00 PM Viky Leyva APRN CNP [facility]        Today's Diagnoses     Incomplete defecation    -  1      Care Instructions    1. Pelvic floor center referral  2. Miralax once a day  3. Urology for difficulty voiding  4. Can return to gastroenterology for abdominal pain          Follow-ups after your visit        Additional Services     Pelvic Floor Referral PFC       Referral to [facility]. Fax to [phone].                  Who to contact     Please call your clinic at [phone] to:    Ask questions about your health    Make or cancel appointments    Discuss your medicines    Learn about your test results    Speak to your doctor   If you have compliments or concerns about an experience at your clinic, or if you wish to file a complaint, please contact [facility] Patient Relations at [phone] or email us at [email or web address]         Additional Information About Your Visit        MyChart Information     [facility] is an electronic gateway that provides easy, online access to your medical records. With [facility], you can request a clinic appointment, read your test results, renew a prescription or communicate with your care team.     To sign up for [facility] visit the website at [email or web address]   You will be asked to enter the access code listed below, as well as some personal information. Please follow the directions to create your username and password.     Your access code is: [email or web address]  Expires: 2018  4:38 PM     Your access code will  in 90 days. If you need help or a new code, please contact your [facility] or call [phone] for  "assistance.        Care EveryWhere ID     This is your Care EveryWhere ID. This could be used by other organizations to access your [facility] medical records  [id number]        Your Vitals Were     Pulse Temperature Height Pulse Oximetry BMI (Body Mass Index)       70 97.9  F (36.6  C) 5' 6\" 99% 23.9 kg/m2        Blood Pressure from Last 3 Encounters:   11/21/17 124/69   11/10/17 96/61   05/03/17 119/65    Weight from Last 3 Encounters:   11/21/17 148 lb 1.6 oz   11/10/17 149 lb 8 oz   05/03/17 144 lb 1.6 oz              We Performed the Following     Pelvic Floor Referral PFC        Primary Care Provider Office Phone # Fax #    DAVID Monahan [facility] [phone] [address] [facility] [address]        Equal Access to Services     [facility]: Hadii kayla martinez hadasho Soarianna, waaxda luqadaha, qaybta kaalmada adeegyada, delisa real hayabraham dale . So [facility] [phone].    ATENCIÓN: Si habla español, tiene a farias disposición servicios gratuitos de asistencia lingüística. Llame al [phone].    We comply with applicable federal civil rights laws and [place] laws. We do not discriminate on the basis of race, color, national origin, age, disability, sex, sexual orientation, or gender identity.            Thank you!     Thank you for choosing [facility]  for your care. Our goal is always to provide you with excellent care. Hearing back from our patients is one way we can continue to improve our services. Please take a few minutes to complete the written survey that you may receive in the mail after your visit with us. Thank you!             Your Updated Medication List - Protect others around you: Learn how to safely use, store and throw away your medicines at www.disposemymeds.org.          This list is accurate as of: 11/21/17  3:08 PM.  Always use your most recent med list.                   Brand Name Dispense Instructions for use Diagnosis    " omeprazole 20 MG CR capsule    priLOSEC    30 capsule    Take 1 capsule (20 mg) by mouth daily    Gastroesophageal reflux disease without esophagitis       sotalol 80 MG tablet    BETAPACE     Take 80 mg by mouth 2 times daily        ZANTAC PO      Reported on 5/3/2017

## 2017-11-21 NOTE — PATIENT INSTRUCTIONS
1. Pelvic floor center referral  2. Miralax once a day  3. Urology for difficulty voiding  4. Can return to gastroenterology for abdominal pain

## 2017-11-21 NOTE — LETTER
"2017      RE: Atilio Wiley  [address] [facility] [address]       Colon and Rectal Surgery Consult Clinic Note    Date: 2017     Referring provider:  Shira Pack PA-C  [address] [place] [address]     RE: Atilio Wiley  : 1980  TYE: 2017    Atilio Wiley is a very pleasant 37 year old male without a significant past medical history with a recent diagnosis of rectal and abdominal pain.  Given these findings they were subsequently sent to the [facility] Surgery Clinic for an opinion on this and a new patient consultation.     Atilio presents today by himself but has his friend, Nydia, on speaker phone during the visit. He reports a long-standing dull, rectal pain. This is sometimes quite mild but is always present. He finds that it is worse when he is lying in bed at night. He has not noticed any interventions that have either make his symptoms worse or better. He thinks this has been present for many years. He reports that he never feels like he completely empties after a bowel movement and always feels \"plugged up\" in both his rectum and in his abdomen. When he feels \"backed up\" he also has difficulty with urination. He either voids frequently or is unable to void and has to occasionally self catheter. He has seen urology for this. He was treated for possible prostatitis which he thinks may have helped somewhat, but he is unsure. He has fairly constant left lower quadrant abdominal pain. He states that this is been present or about 10 years. He feels as though stool gets \"clogged\" on the left side of his abdomen. He rates this pain 5/10. The pain sometimes keeps him up at night. He has tried Maalox which is occasionally helpful. He underwent a colonoscopy with gastroenterology which was reportedly normal. He typically has one moderate sized bowel movement in the morning and then a few smaller bowel movements throughout the day. Even with multiple bowel movements " a day, he feels as though he is not completely empty and feels constipated. When asked if he is otherwise healthy, he states that he has pain that started in his face and has now moved to the rest of his body and is causing arthritis.    Assessment/Plan: 37 year old male with pelvic pain and incomplete evacuation. On exam he has internal hemorrhoids but exam is otherwise normal. I am unsure what is causing his sensation of incomplete evacuation and rectal pain. I recommended pelvic floor testing to further evaluate. I recommended trying daily Miralax to keep stools very soft and to avoid straining with bowel movements. Advised him to continue to follow with gastroenterology for long standing abdominal pain. Return to urology for continued difficulty with voiding as they have recommended urodynamic studies. Patient's questions were answered to his stated satisfaction and he is in agreement with this plan.    Medical history:  Past Medical History:   Diagnosis Date     A-fib (H)      Deviated nasal septum     left sided maxillary facial pain       Surgical history:  Past Surgical History:   Procedure Laterality Date     SINUS SURGERY         Problem list:  Patient Active Problem List    Diagnosis Date Noted     CARDIOVASCULAR SCREENING; LDL GOAL LESS THAN 160 10/31/2010     Priority: Medium     A-fib (H) 02/19/2009     Priority: Medium       Medications:  Current Outpatient Prescriptions   Medication Sig Dispense Refill     sotalol (BETAPACE) 80 MG tablet Take 80 mg by mouth 2 times daily       omeprazole (PRILOSEC) 20 MG CR capsule Take 1 capsule (20 mg) by mouth daily 30 capsule 1     ZANTAC OR Reported on 5/3/2017         Allergies:  No Known Allergies    Family history:  No family history on file.    Social history:  Social History   Substance Use Topics     Smoking status: Never Smoker     Smokeless tobacco: Never Used     Alcohol use No    Marital status: single.  Occupation:     Nursing Notes:  "  Wanda Rios, LPN  11/21/2017  2:36 PM  Signed  Chief Complaint   Patient presents with     Clinic Care Coordination - Initial     Rectal pain       Vitals:    11/21/17 1431   BP: 124/69   Pulse: 70   Temp: 97.9  F (36.6  C)   SpO2: 99%   Weight: 148 lb 1.6 oz   Height: 5' 9.65\"       Body mass index is 21.46 kg/(m^2).    Cecilia SMITH CMA                           Physical Examination:  /69  Pulse 70  Temp 97.9  F (36.6  C)  Ht 5' 6\"  Wt 148 lb 1.6 oz  SpO2 99%  BMI 23.9 kg/m2  General: alert, oriented, in no acute distress, sitting comfortably  HEENT: mucous membranes moist  Perianal external examination:  Perianal skin: Intact with no excoriation or lichenification.  Lesions: No evidence of an external lesion, nodularity, or induration in the perianal region.  Eversion of buttocks: There was not evidence of an anal fissure. Details: N/A.  Skin tags or external hemorrhoids: None.  Digital rectal examination: Was performed.   Sphincter tone: somewhat hypertonic.  Palpable lesions: No.  Prostate: Normal.  Other: None.      Anoscopy: Was performed.   Hemorrhoids: Yes. Grade 2 internal hemorrhoids without bleeding  Lesions: No      Total face to face time was 30 minutes, >50% counseling.    DAVID Coy, NP-C  Colon and Rectal Surgery   [facility]    This note was created using speech recognition software and may contain unintended word substitutions.    DAVID Coy CNP      "

## 2018-02-13 ENCOUNTER — OFFICE VISIT (OUTPATIENT)
Dept: PEDIATRICS | Facility: CLINIC | Age: 38
End: 2018-02-13
Payer: COMMERCIAL

## 2018-02-13 VITALS
SYSTOLIC BLOOD PRESSURE: 110 MMHG | HEART RATE: 62 BPM | HEIGHT: 66 IN | OXYGEN SATURATION: 98 % | DIASTOLIC BLOOD PRESSURE: 60 MMHG | BODY MASS INDEX: 22.82 KG/M2 | TEMPERATURE: 97.3 F | WEIGHT: 142 LBS

## 2018-02-13 DIAGNOSIS — M25.649 STIFFNESS OF HAND JOINT, UNSPECIFIED LATERALITY: ICD-10-CM

## 2018-02-13 DIAGNOSIS — I48.0 PAROXYSMAL A-FIB (H): ICD-10-CM

## 2018-02-13 DIAGNOSIS — R10.84 GENERALIZED ABDOMINAL PAIN: Primary | ICD-10-CM

## 2018-02-13 DIAGNOSIS — R76.8 ELEVATED ANTINUCLEAR ANTIBODY (ANA) LEVEL: ICD-10-CM

## 2018-02-13 DIAGNOSIS — R79.89 ELEVATED PROLACTIN LEVEL: ICD-10-CM

## 2018-02-13 PROBLEM — K21.9 GASTROESOPHAGEAL REFLUX DISEASE WITHOUT ESOPHAGITIS: Status: ACTIVE | Noted: 2017-05-10

## 2018-02-13 LAB
ALBUMIN SERPL-MCNC: 4.6 G/DL (ref 3.4–5)
ALP SERPL-CCNC: 47 U/L (ref 40–150)
ALT SERPL W P-5'-P-CCNC: 41 U/L (ref 0–70)
ANION GAP SERPL CALCULATED.3IONS-SCNC: 5 MMOL/L (ref 3–14)
AST SERPL W P-5'-P-CCNC: 32 U/L (ref 0–45)
BILIRUB SERPL-MCNC: 0.4 MG/DL (ref 0.2–1.3)
BUN SERPL-MCNC: 21 MG/DL (ref 7–30)
CALCIUM SERPL-MCNC: 9.2 MG/DL (ref 8.5–10.1)
CHLORIDE SERPL-SCNC: 105 MMOL/L (ref 94–109)
CK SERPL-CCNC: 121 U/L (ref 30–300)
CO2 SERPL-SCNC: 29 MMOL/L (ref 20–32)
CREAT SERPL-MCNC: 0.92 MG/DL (ref 0.66–1.25)
CRP SERPL-MCNC: <2.9 MG/L (ref 0–8)
ERYTHROCYTE [DISTWIDTH] IN BLOOD BY AUTOMATED COUNT: 12.7 % (ref 10–15)
ERYTHROCYTE [SEDIMENTATION RATE] IN BLOOD BY WESTERGREN METHOD: 7 MM/H (ref 0–15)
GFR SERPL CREATININE-BSD FRML MDRD: >90 ML/MIN/1.7M2
GLUCOSE SERPL-MCNC: 88 MG/DL (ref 70–99)
HCT VFR BLD AUTO: 45.9 % (ref 40–53)
HGB BLD-MCNC: 14.9 G/DL (ref 13.3–17.7)
MCH RBC QN AUTO: 29.4 PG (ref 26.5–33)
MCHC RBC AUTO-ENTMCNC: 32.5 G/DL (ref 31.5–36.5)
MCV RBC AUTO: 91 FL (ref 78–100)
PLATELET # BLD AUTO: 193 10E9/L (ref 150–450)
POTASSIUM SERPL-SCNC: 3.8 MMOL/L (ref 3.4–5.3)
PROLACTIN SERPL-MCNC: 4 UG/L (ref 2–18)
PROT SERPL-MCNC: 8.6 G/DL (ref 6.8–8.8)
PTH-INTACT SERPL-MCNC: 28 PG/ML (ref 12–72)
RBC # BLD AUTO: 5.06 10E12/L (ref 4.4–5.9)
SODIUM SERPL-SCNC: 139 MMOL/L (ref 133–144)
T4 FREE SERPL-MCNC: 0.97 NG/DL (ref 0.76–1.46)
TSH SERPL DL<=0.005 MIU/L-ACNC: 2.32 MU/L (ref 0.4–4)
WBC # BLD AUTO: 5.6 10E9/L (ref 4–11)

## 2018-02-13 PROCEDURE — 86140 C-REACTIVE PROTEIN: CPT | Performed by: NURSE PRACTITIONER

## 2018-02-13 PROCEDURE — 84146 ASSAY OF PROLACTIN: CPT | Performed by: NURSE PRACTITIONER

## 2018-02-13 PROCEDURE — 86038 ANTINUCLEAR ANTIBODIES: CPT | Performed by: NURSE PRACTITIONER

## 2018-02-13 PROCEDURE — 86431 RHEUMATOID FACTOR QUANT: CPT | Performed by: NURSE PRACTITIONER

## 2018-02-13 PROCEDURE — 85652 RBC SED RATE AUTOMATED: CPT | Performed by: NURSE PRACTITIONER

## 2018-02-13 PROCEDURE — 84443 ASSAY THYROID STIM HORMONE: CPT | Performed by: NURSE PRACTITIONER

## 2018-02-13 PROCEDURE — 85027 COMPLETE CBC AUTOMATED: CPT | Performed by: NURSE PRACTITIONER

## 2018-02-13 PROCEDURE — 83970 ASSAY OF PARATHORMONE: CPT | Performed by: NURSE PRACTITIONER

## 2018-02-13 PROCEDURE — 84439 ASSAY OF FREE THYROXINE: CPT | Performed by: NURSE PRACTITIONER

## 2018-02-13 PROCEDURE — 86039 ANTINUCLEAR ANTIBODIES (ANA): CPT | Performed by: NURSE PRACTITIONER

## 2018-02-13 PROCEDURE — 86200 CCP ANTIBODY: CPT | Performed by: NURSE PRACTITIONER

## 2018-02-13 PROCEDURE — 82550 ASSAY OF CK (CPK): CPT | Performed by: NURSE PRACTITIONER

## 2018-02-13 PROCEDURE — 99214 OFFICE O/P EST MOD 30 MIN: CPT | Performed by: NURSE PRACTITIONER

## 2018-02-13 PROCEDURE — 36415 COLL VENOUS BLD VENIPUNCTURE: CPT | Performed by: NURSE PRACTITIONER

## 2018-02-13 PROCEDURE — 80053 COMPREHEN METABOLIC PANEL: CPT | Performed by: NURSE PRACTITIONER

## 2018-02-13 NOTE — PATIENT INSTRUCTIONS
PLAN:   1.   Symptomatic therapy suggested: continue the Miralax once a day .  2.  Orders Placed This Encounter   Procedures     NM Hepatobiliary Scan w GB EF     Comprehensive metabolic panel     CBC with platelets     TSH     T4 free     Parathyroid Hormone Intact     Anti Nuclear Julissa IgG by IFA with Reflex     Rheumatoid factor     CK total     CRP inflammation     Erythrocyte sedimentation rate auto     Cyclic Citrullinated Peptide Antibody IgG     Prolactin     GASTROENTEROLOGY ADULT REF PROCEDURE ONLY       3. Patient needs to follow up in if no improvement,or sooner if worsening of symptoms or other symptoms develop.  FURTHER TESTING:       - HIDA scan of gallbladder   CONSULTATION/REFERRAL to endoscopy   Will follow up and/or notify patient on results via phone or mail to determine further need for followup    It was a pleasure seeing you today at the [facility]. Thank you for allowing us to care for you today. We truly hope we provided you with the excellent service you deserve. Please let us know if there is anything else we can do for you so we can be sure you are leaving completley satisfied with your care experience.       General information about your clinic   Clinic Hours Lab Hours (Appointments are required)   Mon-Thurs: 7:30 AM - 7 PM Mon-Thurs: 7:30 AM - 7 PM   Fri: 7:30 AM - 5 PM Fri: 7:30 AM - 5 PM        After Hours Nurse Advise & Appts:  [facility] Nurse Advisors: [phone]  [facility] On Call: to make appointments anytime: [phone] On Call Physician: call [phone] and answering service will page the on call physician.        For urgent appointments, please call [phone] and ask for the triage nurse or your care team clinic nurse.  How to contact my care team:  MyChart: [email or web address]   Phone: [phone]   Fax: [phone]       [facility]:   Phone: [phone]  Hours: 8:00 AM - 6:00 PM  Medication Refills:  Call your pharmacy and  they will forward the refill to us. Please allow 3 business days for your refills to be completed.       Normal or non-critical lab and imaging results will be communicated to you by MyChart, letter or phone within 7 days.  If you do not hear from us within 10 days, please call the clinic. If you have a critical or abnormal lab result, we will notify you by phone as soon as possible.       We now have PWIC (Pediatric Walk in Care)  Monday-Friday from 7:30-4. Simply walk in and be seen for your urgent needs like cough, fever, rash, diarrhea or vomiting, pink eye, UTI. No appointments needed. Ask one of the team for more information      -Your Care Team:    Dr. Sony Storey - Internal Medicine/Pediatrics   Dr. Emily Castorena - Family Medicine  Dr. Elicia Lambert - Pediatrics  Dr. Ashlyn March - Pediatrics  Denise Ray CNP - Family Practice Nurse Practitioner

## 2018-02-13 NOTE — MR AVS SNAPSHOT
After Visit Summary   2/13/2018    Atilio Wiley    MRN: [id number]           Patient Information     Date Of Birth          1980        Visit Information        Provider Department      2/13/2018 3:30 PM Denise Ray APRN CNP [facility]        Today's Diagnoses     Elevated prolactin level (H)    -  1    Generalized abdominal pain        Paroxysmal a-fib (H)        Stiffness of hand joint, unspecified laterality          Care Instructions    PLAN:   1.   Symptomatic therapy suggested: continue the Miralax once a day .  2.  Orders Placed This Encounter   Procedures     NM Hepatobiliary Scan w GB EF     Comprehensive metabolic panel     CBC with platelets     TSH     T4 free     Parathyroid Hormone Intact     Anti Nuclear Julissa IgG by IFA with Reflex     Rheumatoid factor     CK total     CRP inflammation     Erythrocyte sedimentation rate auto     Cyclic Citrullinated Peptide Antibody IgG     Prolactin     GASTROENTEROLOGY ADULT REF PROCEDURE ONLY       3. Patient needs to follow up in if no improvement,or sooner if worsening of symptoms or other symptoms develop.  FURTHER TESTING:       - HIDA scan of gallbladder   CONSULTATION/REFERRAL to endoscopy   Will follow up and/or notify patient on results via phone or mail to determine further need for followup    It was a pleasure seeing you today at the [facility]. Thank you for allowing us to care for you today. We truly hope we provided you with the excellent service you deserve. Please let us know if there is anything else we can do for you so we can be sure you are leaving completley satisfied with your care experience.       General information about your clinic   Clinic Hours Lab Hours (Appointments are required)   Mon-Thurs: 7:30 AM - 7 PM Mon-Thurs: 7:30 AM - 7 PM   Fri: 7:30 AM - 5 PM Fri: 7:30 AM - 5 PM        After Hours Nurse Advise & Appts:  [facility] Nurse Advisors:  [phone]  [facility] On Call: to make appointments anytime: [phone] On Call Physician: call [phone] and answering service will page the on call physician.        For urgent appointments, please call [phone] and ask for the triage nurse or your care team clinic nurse.  How to contact my care team:  [facility]: [email or web address]   Phone: [phone]   Fax: [phone]       [facility]:   Phone: [phone]  Hours: 8:00 AM - 6:00 PM  Medication Refills:  Call your pharmacy and they will forward the refill to us. Please allow 3 business days for your refills to be completed.       Normal or non-critical lab and imaging results will be communicated to you by MyChart, letter or phone within 7 days.  If you do not hear from us within 10 days, please call the clinic. If you have a critical or abnormal lab result, we will notify you by phone as soon as possible.       We now have PWIC (Pediatric Walk in Care)  Monday-Friday from 7:30-4. Simply walk in and be seen for your urgent needs like cough, fever, rash, diarrhea or vomiting, pink eye, UTI. No appointments needed. Ask one of the team for more information      -Your Care Team:    Dr. Sony Storey - Internal Medicine/Pediatrics   Dr. Emily Castorena - Family Medicine  Dr. Elicia Lambert - Pediatrics  Dr. Ashlyn March - Pediatrics  Denise Ray CNP - Family Practice Nurse Practitioner                         Follow-ups after your visit        Additional Services     GASTROENTEROLOGY ADULT REF PROCEDURE ONLY                 Your next 10 appointments already scheduled     Feb 26, 2018  1:30 PM CST   New Visit with Miya Lorenzana MD   [facility])    [address] [place] [id number] [phone]            Feb 26, 2018  2:40 PM CST   CAROLYNINE JEREMY with Cirilo Rogers MD   [facility])    [address] [address] [place] [address]  [id number]   [phone]              Future tests that were ordered for you today     Open Future Orders        Priority Expected Expires Ordered    NM Hepatobiliary Scan w GB EF Routine  2019            Who to contact     If you have questions or need follow up information about today's clinic visit or your schedule please contact [facility] directly at [phone].  Normal or non-critical lab and imaging results will be communicated to you by [facility], letter or phone within 4 business days after the clinic has received the results. If you do not hear from us within 7 days, please contact the clinic through [facility] or phone. If you have a critical or abnormal lab result, we will notify you by phone as soon as possible.  Submit refill requests through [facility] or call your pharmacy and they will forward the refill request to us. Please allow 3 business days for your refill to be completed.          Additional Information About Your Visit        [facility] Information     [facility] is an electronic gateway that provides easy, online access to your medical records. With [facility], you can request a clinic appointment, read your test results, renew a prescription or communicate with your care team.     To sign up for [facility] visit the website at [email or web address]   You will be asked to enter the access code listed below, as well as some personal information. Please follow the directions to create your username and password.     Your access code is: [email or web address]  Expires: 2018  4:46 PM     Your access code will  in 90 days. If you need help or a new code, please contact your [facility] or call [phone] for assistance.        Care EveryWhere ID     This is your Care EveryWhere ID. This could be used by other organizations to access your [facility] medical records  [id number]        Your Vitals Were     Pulse Temperature Height Pulse  "Oximetry BMI (Body Mass Index)       62 97.3  F (36.3  C) (Temporal) 5' 6\" (1.676 m) 98% 22.92 kg/m2        Blood Pressure from Last 3 Encounters:   02/13/18 110/60   11/21/17 124/69   11/10/17 96/61    Weight from Last 3 Encounters:   02/13/18 142 lb (64.4 kg)   11/21/17 148 lb 1.6 oz (67.2 kg)   11/10/17 149 lb 8 oz (67.8 kg)              We Performed the Following     Anti Nuclear Julissa IgG by IFA with Reflex     CBC with platelets     CK total     Comprehensive metabolic panel     CRP inflammation     Cyclic Citrullinated Peptide Antibody IgG     Erythrocyte sedimentation rate auto     GASTROENTEROLOGY ADULT REF PROCEDURE ONLY     Parathyroid Hormone Intact     Prolactin     Rheumatoid factor     T4 free     TSH        Primary Care Provider Office Phone # Fax #    Chana DAVID [facility] [phone] [address] [facility] [address]        Equal Access to Services     DMIITRY RAMÍREZ : Hadii aad ku hadasho Soomaali, waaxda luqadaha, qaybta kaalmada adeegyada, waxay idiin hayaan topher khsukhdev dale . So [facility] [phone].    ATENCIÓN: Si habla espfredo, tiene a farias disposición servicios gratuitos de asistencia lingüística. Llame al [phone].    We comply with applicable federal civil rights laws and [place] laws. We do not discriminate on the basis of race, color, national origin, age, disability, sex, sexual orientation, or gender identity.            Thank you!     Thank you for choosing [facility]  for your care. Our goal is always to provide you with excellent care. Hearing back from our patients is one way we can continue to improve our services. Please take a few minutes to complete the written survey that you may receive in the mail after your visit with us. Thank you!             Your Updated Medication List - Protect others around you: Learn how to safely use, store and throw away your medicines at www.disposemymeds.org.          This list is accurate as of " 2/13/18  4:46 PM.  Always use your most recent med list.                   Brand Name Dispense Instructions for use Diagnosis    sotalol 80 MG tablet    BETAPACE     Take 80 mg by mouth 2 times daily        ZANTAC PO      Reported on 5/3/2017

## 2018-02-13 NOTE — PROGRESS NOTES
SUBJECTIVE:   Atilio Wiley is a 37 year old male who presents to clinic today for the following health issues:    Chronic abdominal Pain Initial Visit       Pain location: mid abdominal pain  Pain onset: 10 years  Pain is described as Aching and dull, pain radiates down to prostate, notes some irritation in the prostate area when that happens.  Pain rating: intensity ranges from 7/10 to 10/10, and Averages 7/10 on a 0-10 scale.  Aggravating factors include: potatoes, steak varies on the food  Relieving factors include: ice on the back  Activity level/function:              Daily activities:  Unable to perform most daily activities - chores, hobbies, social activities, driving            Work:  Unable to work  Recent family or social stressors:  none noted    Past pain treatments:   Pain Clinic:  No    PT:  Yes 3-4 years   Psychologist:  No  Relaxation techniques/biofeedback:  No  Chiropractor:  YES-4 years ago  Acupuncture:  No  TENs Unit:  No  Injections:  No  Self-care:  No    Here for follow up on abdominall pain which appears to have been going on for about 10 years  In his history it seems he has had a colonoscopy, GI Referral, CT of abdomen and pelvis   States the GI wants to put him on antibiotics   Has had a colonoscopy but not has an endoscopy   Has been on miralax every day been doing that for last 2 or 3 years   States no one knows whats wrong with him and states has seen multiple providers and specialists related to this issue         Amount of exercise or physical activity: None    Problems taking medications regularly: No    Medication side effects: none    Diet: regular (no restrictions)        Problem list and histories reviewed & adjusted, as indicated.  Additional history: as documented    Patient Active Problem List   Diagnosis     A-fib (H)     CARDIOVASCULAR SCREENING; LDL GOAL LESS THAN 160     Past Surgical History:   Procedure Laterality Date     SINUS SURGERY         Social History  "  Substance Use Topics     Smoking status: Never Smoker     Smokeless tobacco: Never Used     Alcohol use No     History reviewed. No pertinent family history.      Current Outpatient Prescriptions   Medication Sig Dispense Refill     ZANTAC OR Reported on 5/3/2017       sotalol (BETAPACE) 80 MG tablet Take 80 mg by mouth 2 times daily       No Known Allergies  Labs reviewed in EPIC    Reviewed and updated as needed this visit by clinical staff  Tobacco  Allergies  Meds  Med Hx  Surg Hx  Fam Hx  Soc Hx      Reviewed and updated as needed this visit by Provider         ROS:  CONSTITUTIONAL:NEGATIVE for fever, chills, change in weight  ENT/MOUTH: NEGATIVE for ear, mouth and throat problems  RESP:NEGATIVE for significant cough or SOB  CV: NEGATIVE for chest pain, palpitations or peripheral edema  GI: POSITIVE for abdominal pain generalized and NEGATIVE for nausea, poor appetite, vomiting and weight loss  MUSCULOSKELETAL: POSITIVE  for fingertips will get stiff for a long time on and off  and NEGATIVE for joint swelling  and joint warmth   NEURO: NEGATIVE for weakness, dizziness or paresthesias    OBJECTIVE:     /60 (BP Location: Right arm, Patient Position: Sitting, Cuff Size: Adult Regular)  Pulse 62  Temp 97.3  F (36.3  C) (Temporal)  Ht 5' 6\" (1.676 m)  Wt 142 lb (64.4 kg)  SpO2 98%  BMI 22.92 kg/m2  Body mass index is 22.92 kg/(m^2).  GENERAL APPEARANCE: alert, active and no distress  HENT: ear canals and TM's normal and nose and mouth without ulcers or lesions  CV: regular rates and rhythm and no murmur, click or rub  ABDOMEN: soft, nontender, without hepatosplenomegaly or masses and bowel sounds normal  MS: extremities normal- no gross deformities noted  However bilateral hands finger tips had some inflammatory changes   SKIN: no suspicious lesions or rashes  NEURO: Normal strength and tone, mentation intact and speech normal  PSYCH: alertness: alert, orientation: time, person, place, affect: " blunted, flat and mood-incongruent, thought content exhibits loose associations,mentation appears normal., patient appears normal, good hygiene, oriented X 3  MENTAL STATUS EXAM:  Appearance/Behavior: No apparent distress, Casually groomed and Dressed appropriately for weather  Speech: Normal  Mood/Affect: flat  Insight: Fair and Poor    Diagnostic Test Results:  Results for orders placed or performed in visit on 02/13/18   Comprehensive metabolic panel   Result Value Ref Range    Sodium 139 133 - 144 mmol/L    Potassium 3.8 3.4 - 5.3 mmol/L    Chloride 105 94 - 109 mmol/L    Carbon Dioxide 29 20 - 32 mmol/L    Anion Gap 5 3 - 14 mmol/L    Glucose 88 70 - 99 mg/dL    Urea Nitrogen 21 7 - 30 mg/dL    Creatinine 0.92 0.66 - 1.25 mg/dL    GFR Estimate >90 >60 mL/min/1.7m2    GFR Estimate If Black >90 >60 mL/min/1.7m2    Calcium 9.2 8.5 - 10.1 mg/dL    Bilirubin Total 0.4 0.2 - 1.3 mg/dL    Albumin 4.6 3.4 - 5.0 g/dL    Protein Total 8.6 6.8 - 8.8 g/dL    Alkaline Phosphatase 47 40 - 150 U/L    ALT 41 0 - 70 U/L    AST 32 0 - 45 U/L   CBC with platelets   Result Value Ref Range    WBC 5.6 4.0 - 11.0 10e9/L    RBC Count 5.06 4.4 - 5.9 10e12/L    Hemoglobin 14.9 13.3 - 17.7 g/dL    Hematocrit 45.9 40.0 - 53.0 %    MCV 91 78 - 100 fl    MCH 29.4 26.5 - 33.0 pg    MCHC 32.5 31.5 - 36.5 g/dL    RDW 12.7 10.0 - 15.0 %    Platelet Count 193 150 - 450 10e9/L   TSH   Result Value Ref Range    TSH 2.32 0.40 - 4.00 mU/L   T4 free   Result Value Ref Range    T4 Free 0.97 0.76 - 1.46 ng/dL   Parathyroid Hormone Intact   Result Value Ref Range    Parathyroid Hormone Intact 28 12 - 72 pg/mL   Anti Nuclear Julissa IgG by IFA with Reflex   Result Value Ref Range    PAMELA interpretation Positive (A) NEG^Negative    PAMELA pattern 1 HOMOGENEOUS     PAMELA titer 1 1:160    Rheumatoid factor   Result Value Ref Range    Rheumatoid Factor <20 <20 IU/mL   CK total   Result Value Ref Range    CK Total 121 30 - 300 U/L   CRP inflammation   Result Value Ref  Range    CRP Inflammation <2.9 0.0 - 8.0 mg/L   Erythrocyte sedimentation rate auto   Result Value Ref Range    Sed Rate 7 0 - 15 mm/h   Cyclic Citrullinated Peptide Antibody IgG   Result Value Ref Range    Cyclic Citrullinated Peptide Antibody, IgG 4 <7 U/mL   Prolactin   Result Value Ref Range    Prolactin 4 2 - 18 ug/L     Recent Results (from the past 744 hour(s))   NM Hepatobiliary Scan w GB EF    Narrative    Examination:  NM HEPATOBILIARY SCAN WITH GB EF      Date: 2/21/2018 10:58 AM.    Indication:   ; Generalized abdominal pain     Additional Information: none    Technique:    The patient received 6.4 mCi of Tc-99m Choletec intravenously. Images  were obtained out through 60 minutes.   At 60 minutes the patient  received 28 gr oral fat from boost. An additional 45 minutes of images  were obtained after the gall bladder contraction intervention.    Findings:    There is prompt clearance of the radionuclide from the blood pool into  the liver. By 15 minutes there is clear visualization of the  intrahepatic ducts as well as the upper common bile duct. By 55  minutes there is visualization of the gallbladder. At 20 minutes there  is emptying from the common bile duct into the small bowel.    After administration of oral fat from boost the Gallbladder ejection  fraction was measured at 53%.  The normal gallbladder EF based on a 45  minute infusion is >40%.    Enterogastric reflux was present.      Impression    Impression:  1. No evidence for acute or chronic cholecystitis.  2. Enterogastric reflux.    =======================    The normal Gall Bladder ejection fraction for a 45 minute infusion is  >40%    I have personally reviewed the examination and initial interpretation  and I agree with the findings.    CHRISTOPHER MCKENZIE MD       ASSESSMENT/PLAN:       Atilio was seen today for abdominal pain.    Diagnoses and all orders for this visit:    Generalized abdominal pain  -     Cancel: XR Abdomen 2 Views;  Future  -     GASTROENTEROLOGY ADULT REF PROCEDURE ONLY  -     NM Hepatobiliary Scan w GB EF; Future  Continue Miralax daily     Elevated prolactin level (H)  -     Comprehensive metabolic panel  -     CBC with platelets  -     TSH  -     T4 free  -     Parathyroid Hormone Intact  -     Prolactin    Paroxysmal a-fib (H)    Stiffness of hand joint, unspecified laterality  -     Anti Nuclear Julissa IgG by IFA with Reflex  -     Rheumatoid factor  -     CK total  -     CRP inflammation  -     Erythrocyte sedimentation rate auto  -     Cyclic Citrullinated Peptide Antibody IgG  -     RHEUMATOLOGY REFERRAL    Elevated antinuclear antibody (PAMELA) level  -     RHEUMATOLOGY REFERRAL    PLAN:   . Patient needs to follow up in if no improvement,or sooner if worsening of symptoms or other symptoms develop.  FURTHER TESTING:       - HIDA scan of gallbladder   CONSULTATION/REFERRAL to endoscopy   Will follow up and/or notify patient on results via phone or mail to determine further need for followup      See Patient Instructions    DAVID Guzman [facility]

## 2018-02-13 NOTE — NURSING NOTE
"Chief Complaint   Patient presents with     Abdominal Pain     mid abdominal pain ongoing for 10 years       Initial /60 (BP Location: Right arm, Patient Position: Sitting, Cuff Size: Adult Regular)  Pulse 62  Temp 97.3  F (36.3  C) (Temporal)  Ht 5' 6\" (1.676 m)  Wt 142 lb (64.4 kg)  SpO2 98%  BMI 22.92 kg/m2 Estimated body mass index is 22.92 kg/(m^2) as calculated from the following:    Height as of this encounter: 5' 6\" (1.676 m).    Weight as of this encounter: 142 lb (64.4 kg).  Medication Reconciliation: complete      DAILY Srerano      "

## 2018-02-14 LAB
ANA PAT SER IF-IMP: ABNORMAL
ANA SER QL IF: POSITIVE
ANA TITR SER IF: ABNORMAL {TITER}
CCP AB SER IA-ACNC: 4 U/ML
RHEUMATOID FACT SER NEPH-ACNC: <20 IU/ML (ref 0–20)

## 2018-02-15 ENCOUNTER — TELEPHONE (OUTPATIENT)
Dept: RHEUMATOLOGY | Facility: CLINIC | Age: 38
End: 2018-02-15

## 2018-02-15 ENCOUNTER — TELEPHONE (OUTPATIENT)
Dept: PEDIATRICS | Facility: CLINIC | Age: 38
End: 2018-02-15

## 2018-02-15 NOTE — TELEPHONE ENCOUNTER
Gave patient results below.  Patient transferred to schedule rheumatology consult.  Patient asked does he have to do the other tests for his stomach?  Informed him yes he should still continue to do the testing that was ordered by Asha Ray.    Ilana Cunningham RN,   [facility]

## 2018-02-15 NOTE — TELEPHONE ENCOUNTER
Anti Nuclear Julissa IgG by IFA with Reflex   Status:  Final result   Visible to patient:  No (Not Released) Dx:  Stiffness of hand joint, unspecified ... Order: [id number]       Notes Recorded by Denise Ray APRN CNP on 2/14/2018 at 6:38 PM  Please call and let him know all labs are normal except lupus screen was positive  This does not mean he has lupus but with his symptoms needs to be evaluated further by rheumatology    Please call [phone] to make appointment  if you do not hear from referrals in the next few days.           Ref Range & Units 2d ago       PAMELA interpretation NEG^Negative Positive (A)     Comments:                                    Reference range:   <1:40  NEGATIVE   1:40 - 1:80  BORDERLINE POSITIVE   >1:80 POSITIVE        PAMELA pattern 1  HOMOGENEOUS     PAMELA titer 1  1:160     Resulting Agency  [facility]          Specimen Collected: 02/13/18  5:11 PM Last Resulted: 02/14/18  2:11 PM                                 Cyclic Citrullinated Peptide Antibody IgG   Status:  Final result   Visible to patient:  No (Not Released) Dx:  Stiffness of hand joint, unspecified ... Order: [id number]       Notes Recorded by Denise Ray APRN CNP on 2/14/2018 at 6:38 PM  Please call and let him know all labs are normal except lupus screen was positive  This does not mean he has lupus but with his symptoms needs to be evaluated further by rheumatology    Please call [phone] to make appointment  if you do not hear from referrals in the next few days.           Ref Range & Units 2d ago       Cyclic Citrullinated Peptide Antibody, IgG <7 U/mL 4     Comments: Negative     Resulting Agency  [facility]          Specimen Collected: 02/13/18  5:11 PM Last Resulted: 02/14/18 12:41 PM                                 Rheumatoid factor   Status:  Final result   Visible to patient:  No (Not Released) Dx:  Stiffness of hand joint,  unspecified ... Order: [id number]       Notes Recorded by Denise Ray APRN CNP on 2/14/2018 at 6:38 PM  Please call and let him know all labs are normal except lupus screen was positive  This does not mean he has lupus but with his symptoms needs to be evaluated further by rheumatology    Please call [phone] to make appointment  if you do not hear from referrals in the next few days.           Ref Range & Units 2d ago       Rheumatoid Factor <20 IU/mL <20     Resulting Agency  [facility]          Specimen Collected: 02/13/18  5:11 PM Last Resulted: 02/14/18  9:18 AM                                 Parathyroid Hormone Intact   Status:  Final result   Visible to patient:  No (Not Released) Dx:  Elevated prolactin level (H) Order: [id number]       Notes Recorded by Densie Ray APRN CNP on 2/14/2018 at 6:38 PM  Please call and let him know all labs are normal except lupus screen was positive  This does not mean he has lupus but with his symptoms needs to be evaluated further by rheumatology    Please call [phone] to make appointment  if you do not hear from referrals in the next few days.           Ref Range & Units 2d ago       Parathyroid Hormone Intact 12 - 72 pg/mL 28     Resulting Agency  [facility]          Specimen Collected: 02/13/18  5:11 PM Last Resulted: 02/13/18 10:44 PM                                 Prolactin   Status:  Final result   Visible to patient:  No (Not Released) Dx:  Elevated prolactin level (H) Order: [id number]       Notes Recorded by Denise Ray APRN CNP on 2/14/2018 at 6:38 PM  Please call and let him know all labs are normal except lupus screen was positive  This does not mean he has lupus but with his symptoms needs to be evaluated further by rheumatology    Please call [phone] to make appointment  if you do not hear from referrals in the next few days.           Ref Range &  Units 2d ago       Prolactin 2 - 18 ug/L 4     Resulting Agency  [facility]          Specimen Collected: 02/13/18  5:11 PM Last Resulted: 02/13/18  9:58 PM                                 TSH   Status:  Final result   Visible to patient:  No (Not Released) Dx:  Elevated prolactin level (H) Order: [id number]       Notes Recorded by Denise Ray APRN CNP on 2/14/2018 at 6:38 PM  Please call and let him know all labs are normal except lupus screen was positive  This does not mean he has lupus but with his symptoms needs to be evaluated further by rheumatology    Please call [phone] to make appointment  if you do not hear from referrals in the next few days.           Ref Range & Units 2d ago       TSH 0.40 - 4.00 mU/L 2.32     Resulting Agency  [facility]          Specimen Collected: 02/13/18  5:11 PM Last Resulted: 02/13/18  5:51 PM                                 T4 free   Status:  Final result   Visible to patient:  No (Not Released) Dx:  Elevated prolactin level (H) Order: [id number]       Notes Recorded by Denise Ray APRN CNP on 2/14/2018 at 6:38 PM  Please call and let him know all labs are normal except lupus screen was positive  This does not mean he has lupus but with his symptoms needs to be evaluated further by rheumatology    Please call [phone] to make appointment  if you do not hear from referrals in the next few days.           Ref Range & Units 2d ago       T4 Free 0.76 - 1.46 ng/dL 0.97     Resulting Agency  [facility]          Specimen Collected: 02/13/18  5:11 PM Last Resulted: 02/13/18  5:46 PM                                 Comprehensive metabolic panel   Status:  Final result   Visible to patient:  No (Not Released) Dx:  Elevated prolactin level (H) Order: [id number]       Notes Recorded by Denise Ray APRN CNP on 2/14/2018 at 6:38 PM  Please call and let him know all labs are normal except lupus screen was positive  This does not mean he  has lupus but with his symptoms needs to be evaluated further by rheumatology    Please call [phone] to make appointment  if you do not hear from referrals in the next few days.           Ref Range & Units 2d ago       Sodium 133 - 144 mmol/L 139     Potassium 3.4 - 5.3 mmol/L 3.8     Chloride 94 - 109 mmol/L 105     Carbon Dioxide 20 - 32 mmol/L 29     Anion Gap 3 - 14 mmol/L 5     Glucose 70 - 99 mg/dL 88     Comments: Non Fasting     Urea Nitrogen 7 - 30 mg/dL 21     Creatinine 0.66 - 1.25 mg/dL 0.92     GFR Estimate >60 mL/min/1.7m2 >90     Comments: Non  GFR Calc     GFR Estimate If Black >60 mL/min/1.7m2 >90     Comments:  GFR Calc     Calcium 8.5 - 10.1 mg/dL 9.2     Bilirubin Total 0.2 - 1.3 mg/dL 0.4     Albumin 3.4 - 5.0 g/dL 4.6     Protein Total 6.8 - 8.8 g/dL 8.6     Alkaline Phosphatase 40 - 150 U/L 47     ALT 0 - 70 U/L 41     AST 0 - 45 U/L 32     Resulting Agency  [facility]          Specimen Collected: 02/13/18  5:11 PM Last Resulted: 02/13/18  5:46 PM                                 CK total   Status:  Final result   Visible to patient:  No (Not Released) Dx:  Stiffness of hand joint, unspecified ... Order: [id number]       Notes Recorded by Denise Ray APRN CNP on 2/14/2018 at 6:38 PM  Please call and let him know all labs are normal except lupus screen was positive  This does not mean he has lupus but with his symptoms needs to be evaluated further by rheumatology    Please call [phone] to make appointment  if you do not hear from referrals in the next few days.           Ref Range & Units 2d ago       CK Total 30 - 300 U/L 121     Resulting Agency  [facility]          Specimen Collected: 02/13/18  5:11 PM Last Resulted: 02/13/18  5:46 PM                                 CRP inflammation   Status:  Final result   Visible to patient:  No (Not Released) Dx:  Stiffness of hand joint, unspecified ... Order: [id number]       Notes Recorded by Denise Ray  DAVID CNP on 2/14/2018 at 6:38 PM  Please call and let him know all labs are normal except lupus screen was positive  This does not mean he has lupus but with his symptoms needs to be evaluated further by rheumatology    Please call [phone] to make appointment  if you do not hear from referrals in the next few days.           Ref Range & Units 2d ago       CRP Inflammation 0.0 - 8.0 mg/L <2.9     Resulting Agency  [facility]          Specimen Collected: 02/13/18  5:11 PM Last Resulted: 02/13/18  5:46 PM                                 Erythrocyte sedimentation rate auto   Status:  Final result   Visible to patient:  No (Not Released) Dx:  Stiffness of hand joint, unspecified ... Order: [id number]       Notes Recorded by Denise Ray APRN CNP on 2/14/2018 at 6:38 PM  Please call and let him know all labs are normal except lupus screen was positive  This does not mean he has lupus but with his symptoms needs to be evaluated further by rheumatology    Please call [phone] to make appointment  if you do not hear from referrals in the next few days.           Ref Range & Units 2d ago       Sed Rate 0 - 15 mm/h 7     Resulting Agency  [facility]          Specimen Collected: 02/13/18  5:11 PM Last Resulted: 02/13/18  5:31 PM                                 CBC with platelets   Status:  Final result   Visible to patient:  No (Not Released) Dx:  Elevated prolactin level (H) Order: [id number]       Notes Recorded by Denise Ray APRN CNP on 2/14/2018 at 6:38 PM  Please call and let him know all labs are normal except lupus screen was positive  This does not mean he has lupus but with his symptoms needs to be evaluated further by rheumatology    Please call [phone] to make appointment  if you do not hear from referrals in the next few days.           Ref Range & Units 2d ago       WBC 4.0 - 11.0 10e9/L 5.6     RBC Count 4.4 - 5.9 10e12/L 5.06     Hemoglobin 13.3 - 17.7 g/dL 14.9     Hematocrit 40.0 - 53.0 % 45.9      MCV 78 - 100 fl 91     MCH 26.5 - 33.0 pg 29.4     MCHC 31.5 - 36.5 g/dL 32.5     RDW 10.0 - 15.0 % 12.7     Platelet Count 150 - 450 10e9/L 193     Resulting Agency  [facility]          Specimen Collected: 02/13/18  5:11 PM Last Resulted: 02/13/18  5:30 PM                      Arianna Chu RN

## 2018-02-16 ENCOUNTER — TELEPHONE (OUTPATIENT)
Dept: OTOLARYNGOLOGY | Facility: CLINIC | Age: 38
End: 2018-02-16

## 2018-02-16 NOTE — TELEPHONE ENCOUNTER
Talked to patient and asked him to bring his records from previous surgery. Patient stated he would bring them.  Marita Richards CMA ([facility])

## 2018-02-21 ENCOUNTER — RADIANT APPOINTMENT (OUTPATIENT)
Dept: NUCLEAR MEDICINE | Facility: CLINIC | Age: 38
End: 2018-02-21
Attending: NURSE PRACTITIONER
Payer: COMMERCIAL

## 2018-02-21 DIAGNOSIS — R10.84 GENERALIZED ABDOMINAL PAIN: ICD-10-CM

## 2018-02-21 PROCEDURE — 78227 HEPATOBIL SYST IMAGE W/DRUG: CPT | Performed by: RADIOLOGY

## 2018-02-21 PROCEDURE — A9537 TC99M MEBROFENIN: HCPCS | Performed by: RADIOLOGY

## 2018-02-21 RX ORDER — KIT FOR THE PREPARATION OF TECHNETIUM TC 99M MEBROFENIN 45 MG/10ML
6.4 INJECTION, POWDER, LYOPHILIZED, FOR SOLUTION INTRAVENOUS ONCE
Status: COMPLETED | OUTPATIENT
Start: 2018-02-21 | End: 2018-02-21

## 2018-02-21 RX ADMIN — KIT FOR THE PREPARATION OF TECHNETIUM TC 99M MEBROFENIN 6.4 MILLICURIE: 45 INJECTION, POWDER, LYOPHILIZED, FOR SOLUTION INTRAVENOUS at 10:25

## 2018-02-22 ENCOUNTER — TELEPHONE (OUTPATIENT)
Dept: PEDIATRICS | Facility: CLINIC | Age: 38
End: 2018-02-22

## 2018-02-22 NOTE — TELEPHONE ENCOUNTER
Notes Recorded by Denise Ray APRN CNP on 2/21/2018 at 8:04 PM  Please call   Gallbladder scan is normal   Denise Ray NP, APRN CNP

## 2018-02-26 ENCOUNTER — OFFICE VISIT (OUTPATIENT)
Dept: ORTHOPEDICS | Facility: CLINIC | Age: 38
End: 2018-02-26
Payer: COMMERCIAL

## 2018-02-26 ENCOUNTER — OFFICE VISIT (OUTPATIENT)
Dept: OTOLARYNGOLOGY | Facility: CLINIC | Age: 38
End: 2018-02-26
Payer: COMMERCIAL

## 2018-02-26 ENCOUNTER — RADIANT APPOINTMENT (OUTPATIENT)
Dept: GENERAL RADIOLOGY | Facility: CLINIC | Age: 38
End: 2018-02-26
Attending: PREVENTIVE MEDICINE
Payer: COMMERCIAL

## 2018-02-26 VITALS
DIASTOLIC BLOOD PRESSURE: 66 MMHG | BODY MASS INDEX: 22.76 KG/M2 | HEART RATE: 69 BPM | WEIGHT: 145 LBS | SYSTOLIC BLOOD PRESSURE: 103 MMHG | HEIGHT: 67 IN

## 2018-02-26 VITALS — BODY MASS INDEX: 22.76 KG/M2 | WEIGHT: 145 LBS | HEIGHT: 67 IN

## 2018-02-26 DIAGNOSIS — M54.2 CERVICALGIA: ICD-10-CM

## 2018-02-26 DIAGNOSIS — M51.26 LUMBAR DISC HERNIATION: Primary | ICD-10-CM

## 2018-02-26 DIAGNOSIS — J34.89 NASAL PAIN: Primary | ICD-10-CM

## 2018-02-26 DIAGNOSIS — M51.26 LUMBAR DISC HERNIATION: ICD-10-CM

## 2018-02-26 PROCEDURE — 99214 OFFICE O/P EST MOD 30 MIN: CPT | Performed by: PREVENTIVE MEDICINE

## 2018-02-26 PROCEDURE — 72040 X-RAY EXAM NECK SPINE 2-3 VW: CPT | Performed by: RADIOLOGY

## 2018-02-26 PROCEDURE — 72100 X-RAY EXAM L-S SPINE 2/3 VWS: CPT | Performed by: RADIOLOGY

## 2018-02-26 PROCEDURE — 99203 OFFICE O/P NEW LOW 30 MIN: CPT | Performed by: OTOLARYNGOLOGY

## 2018-02-26 ASSESSMENT — PAIN SCALES - GENERAL: PAINLEVEL: MODERATE PAIN (5)

## 2018-02-26 NOTE — LETTER
2018         RE: Atilio Wiley  [address] [place] [id number]        Dear Colleague,    Thank you for referring your patient, Atilio Wiley, to the [facility]. Please see a copy of my visit note below.    Otolaryngology Adult Consultation    Patient: Atilio Wiley  : 1980      HPI:  Atilio Wiley is a 37 year old male seen today in the Otolaryngology Clinic for nasal pain.  Patient reports that he has had nasal pain for approximately 15 years. Pain is specifically located on his left nasal bone. When he pushes on it very tender and uncomfortable. It tends to alleviate when he pushes the tip of his nose to the left. His past surgical history is significant for history of a septoplasty as well as a left maxillary sinus procedure in . This was done in [place]. He reports that these procedures were done in an effort to try to improve his pain. He reports that he had no change after the surgeries. He has seen another ENT in  who was not able to find any cause for the pain. Previous CT scans that he has done by report have all shown no evidence of masses. He does also have chronic abdominal pain. He does have a positive MARYANN. ESR and CRP are normal. He does have a referral for rheumatology. He's holding on that until after he's met with GI. 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    Medications:  Current Outpatient Rx   Medication Sig Dispense Refill     sotalol (BETAPACE) 80 MG tablet Take 80 mg by mouth 2 times daily       ZANTAC OR Reported on 5/3/2017         Allergies: Review of patient's allergies indicates no known allergies.     PMH:  Past Medical History:   Diagnosis Date     A-fib (H)      Deviated nasal septum     left sided maxillary facial pain       PSH:  Past Surgical History:   Procedure Laterality Date     SINUS SURGERY         FH:  No family history on file.     SH:  Social History    Substance Use Topics     Smoking status: Never Smoker     Smokeless tobacco: Never Used     Alcohol use No       Review of Systems  No flowsheet data found.    Physical Exam:    GEN:  The patient is alert, oriented and in no acute distress.  HEAD:  Head, face scalp is grossly normal.  EARS:  Ears demonstrate normal canals, auricles and tympanic membranes.  NOSE:  Externally the nose is widened at the middle third. Palpation of the nasal bones reveals that they are stable. I'm not able to feel any masses externally. Intranasally the mucosa is very healthy. Septum is still slightly deviated to the left. There is no septal perforation.   ORAL:  Oral cavity shows healthy mucosa with out ulceration, masses or other lesions                involving the tongue, palate, buccal mucosa, floor of mouth or gingiva.                   Assessment/Plan:patient presents with left chronic nasal pain. He is quite frustrated and understandably in that he's never been able to get a etiology for his nasal pain. My examination today shows no significant structural abnormalities. I see no masses or lesions. The tissues overall looked very healthy. 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 It's possible with an positive PAMELA that he also might have Wegener's. However his nasal exam is quite healthy appearing on the inside. There is not a lot of crusting or septal cartilage deformation that would be suggestive of Wegener's. Patient is wondering if additional procedures to correct the deviated septum would improve his facial pain. I discussed that this would be an evaluation and assessment that I would recommend a facial plastic surgeon make. However my opinion would be that it's unlikely that additional procedure would be beneficial.  I offered him a referral to meet with donor facial plastic surgeons the patient declined. At this time patient has left nasal pain of which etiology is  unknown. It is possible that there might be a neurologic origin to it such as trigeminal neuralgia. I do not think additional imaging is necessary at this time.    I spent a total of 35 minutes face-to-face with Atilio Wiley during today's office visit.  Over 50% of this time was spent counseling the patient on and/or coordinating care as documented in my assessment and plan.        Again, thank you for allowing me to participate in the care of your patient.        Sincerely,        Miya Lorenzana MD

## 2018-02-26 NOTE — MR AVS SNAPSHOT
After Visit Summary   2/26/2018    Atilio Wiley    MRN: [id number]           Patient Information     Date Of Birth          1980        Visit Information        Provider Department      2/26/2018 1:30 PM Miya Lorenzana MD [facility]        Today's Diagnoses     Nasal pain    -  1       Follow-ups after your visit        Your next 10 appointments already scheduled     Mar 06, 2018   Procedure with William Charles Duane, MD   [facility] (--)    [address] [facility] [address]   [phone]            Mar 06, 2018 11:45 AM CST   (Arrive by 11:30 AM)   MR LUMBAR SPINE W/O CONTRAST with MGMR1   [facility] ([facility]    [address]  [facility] [address]   [phone]           Take your medicines as usual, unless your doctor tells you not to. Bring a list of your current medicines to your exam (including vitamins, minerals and over-the-counter drugs). Also bring the results of similar scans you may have had.  Please remove any body piercings and hair extensions before you arrive.  Follow your doctor s orders. If you do not, we may have to postpone your exam.  You may or may not receive IV contrast for this exam pending the discretion of the Radiologist.  You do not need to do anything special to prepare.  The MRI machine uses a strong magnet. Please wear clothes without metal (snaps, zippers). A sweatsuit works well, or we may give you a hospital gown.   **IMPORTANT** THE INSTRUCTIONS BELOW ARE ONLY FOR THOSE PATIENTS WHO HAVE BEEN PRESCRIBED SEDATION OR GENERAL ANESTHESIA DURING THEIR MRI PROCEDURE:  IF YOUR DOCTOR PRESCRIBED ORAL SEDATION (take medicine to help you relax during your exam):   You must get the medicine from your doctor (oral medication) before you arrive. Bring the medicine to the exam. Do not take it at home. You ll be told when to take it upon arriving for your exam.    Arrive one hour early. Bring someone who can take you home after the test. Your medicine will make you sleepy. After the exam, you may not drive, take a bus or take a taxi by yourself.  IF YOUR DOCTOR PRESCRIBED IV SEDATION:   Arrive one hour early. Bring someone who can take you home after the test. Your medicine will make you sleepy. After the exam, you may not drive, take a bus or take a taxi by yourself.   No eating 6 hours before your exam. You may have clear liquids up until 4 hours before your exam. (Clear liquids include water, clear tea, black coffee and fruit juice without pulp.)  IF YOUR DOCTOR PRESCRIBED ANESTHESIA (be asleep for your exam):   Arrive 1 1/2 hours early. Bring someone who can take you home after the test. You may not drive, take a bus or take a taxi by yourself.   No eating 8 hours before your exam. You may have clear liquids up until 4 hours before your exam. (Clear liquids include water, clear tea, black coffee and fruit juice without pulp.)   You will spend four to five hours in the recovery room.  Please call the Imaging Department at your exam site with any questions.              Future tests that were ordered for you today     Open Future Orders        Priority Expected Expires Ordered    MR Lumbar Spine w/o Contrast Routine  2/26/2019 2/26/2018            Who to contact     If you have questions or need follow up information about today's clinic visit or your schedule please contact [facility] directly at [phone].  Normal or non-critical lab and imaging results will be communicated to you by MyChart, letter or phone within 4 business days after the clinic has received the results. If you do not hear from us within 7 days, please contact the clinic through IORevolutiont or phone. If you have a critical or abnormal lab result, we will notify you by phone as soon as possible.  Submit refill requests through [facility] or call your pharmacy and they will forward the  "refill request to us. Please allow 3 business days for your refill to be completed.          Additional Information About Your Visit        Kleerhart Information     Socrative is an electronic gateway that provides easy, online access to your medical records. With Socrative, you can request a clinic appointment, read your test results, renew a prescription or communicate with your care team.     To sign up for Socrative visit the website at [email or web address]   You will be asked to enter the access code listed below, as well as some personal information. Please follow the directions to create your username and password.     Your access code is: [email or web address]  Expires: 2018  4:46 PM     Your access code will  in 90 days. If you need help or a new code, please contact your [facility] or call [phone] for assistance.        Care EveryWhere ID     This is your Care EveryWhere ID. This could be used by other organizations to access your [place] medical records  [id number]        Your Vitals Were     Pulse Height BMI (Body Mass Index)             69 1.702 m (5' 7\") 22.71 kg/m2          Blood Pressure from Last 3 Encounters:   18 103/66   18 110/60   17 124/69    Weight from Last 3 Encounters:   18 65.8 kg (145 lb)   18 65.8 kg (145 lb)   18 64.4 kg (142 lb)              Today, you had the following     No orders found for display       Primary Care Provider Office Phone # Fax #    DAVID [facility] [phone]       [address] [facility] [address]        Equal Access to Services     DIMITRY RAMÍREZ : Hadii kayla angelo Soarianna, waaxda luqadaha, qaybta kaalmada adedevonteyada, delisa dale . So [facility] [phone].    ATENCIÓN: Si habla español, tiene a farias disposición servicios gratuitos de asistencia lingüística. Llame al [phone].    We comply with applicable federal civil rights laws and " [place] laws. We do not discriminate on the basis of race, color, national origin, age, disability, sex, sexual orientation, or gender identity.            Thank you!     Thank you for choosing [facility]  for your care. Our goal is always to provide you with excellent care. Hearing back from our patients is one way we can continue to improve our services. Please take a few minutes to complete the written survey that you may receive in the mail after your visit with us. Thank you!             Your Updated Medication List - Protect others around you: Learn how to safely use, store and throw away your medicines at www.disposemymeds.org.          This list is accurate as of 2/26/18  4:10 PM.  Always use your most recent med list.                   Brand Name Dispense Instructions for use Diagnosis    sotalol 80 MG tablet    BETAPACE     Take 80 mg by mouth 2 times daily        ZANTAC PO      Reported on 5/3/2017

## 2018-02-26 NOTE — PATIENT INSTRUCTIONS
Thanks for coming today.  Ortho/Sports Medicine Clinic  [address] [place] [address]    To schedule future appointments in Ortho Clinic, you may call [phone].    To schedule ordered imaging by your provider:   Call Central Imaging [phone]    To schedule an injection ordered by your provider:  Call Central Imaging Injection scheduling line: [phone]  Group Therapy Recordshart available online at:  [email or web address]    Please call if any further questions or concerns ([phone]).  Clinic hours 8 am to 5 pm.    Return to clinic (call) if symptoms worsen or fail to improve.

## 2018-02-26 NOTE — NURSING NOTE
"Atilio Wiley's goals for this visit include:   Chief Complaint   Patient presents with     Consult     \"nose pain for 15 years\"       He requests these members of his care team be copied on today's visit information: yes      PCP: Chana Poon    Referring Provider:  No referring provider defined for this encounter.    Chief Complaint   Patient presents with     Consult     \"nose pain for 15 years\"       Initial /66  Pulse 69  Ht 1.702 m (5' 7\")  Wt 65.8 kg (145 lb)  BMI 22.71 kg/m2 Estimated body mass index is 22.71 kg/(m^2) as calculated from the following:    Height as of this encounter: 1.702 m (5' 7\").    Weight as of this encounter: 65.8 kg (145 lb).  Medication Reconciliation: complete    "

## 2018-02-26 NOTE — MR AVS SNAPSHOT
After Visit Summary   2018    Atilio Wiley    MRN: [id number]           Patient Information     Date Of Birth          1980        Visit Information        Provider Department      2018 2:40 PM Cirilo Rogers MD [facility]        Today's Diagnoses     Lumbar disc herniation    -  1    Cervicalgia          Care Instructions    Thanks for coming today.  Ortho/Sports Medicine Clinic  [address] [place] [address]    To schedule future appointments in Ortho Clinic, you may call [phone].    To schedule ordered imaging by your provider:   Call Central Imaging [phone]    To schedule an injection ordered by your provider:  Call Central Imaging Injection scheduling line: [phone]  GIVINGtraxhart available online at:  [email or web address]    Please call if any further questions or concerns ([phone]).  Clinic hours 8 am to 5 pm.    Return to clinic (call) if symptoms worsen or fail to improve.          Follow-ups after your visit        Your next 10 appointments already scheduled     2018  3:40 PM CST   (Arrive by 3:25 PM)   XR CERVICAL SPINE 2/3 VIEWS with MGXR2   [facility]    [address] [place] [address] [phone]           Please bring a list of your current medicines to your exam. (Include vitamins, minerals and over-thecounter medicines.) Leave your valuables at home.  Tell your doctor if there is a chance you may be pregnant.  You do not need to do anything special for this exam.            Mar 06, 2018   Procedure with William Charles Duane, MD   [facility] (--)    [address] [place] [address] [phone]              Future tests that were ordered for you today     Open Future Orders        Priority Expected Expires Ordered    MR Lumbar Spine w/o Contrast Routine  2019            Who to contact  "    If you have questions or need follow up information about today's clinic visit or your schedule please contact [facility] directly at [phone].  Normal or non-critical lab and imaging results will be communicated to you by MyChart, letter or phone within 4 business days after the clinic has received the results. If you do not hear from us within 7 days, please contact the clinic through KROGNIhart or phone. If you have a critical or abnormal lab result, we will notify you by phone as soon as possible.  Submit refill requests through [employer] or call your pharmacy and they will forward the refill request to us. Please allow 3 business days for your refill to be completed.          Additional Information About Your Visit        COVEGA Information     COVEGA is an electronic gateway that provides easy, online access to your medical records. With COVEGA, you can request a clinic appointment, read your test results, renew a prescription or communicate with your care team.     To sign up for COVEGA visit the website at [email or web address]   You will be asked to enter the access code listed below, as well as some personal information. Please follow the directions to create your username and password.     Your access code is: [email or web address]  Expires: 2018  4:46 PM     Your access code will  in 90 days. If you need help or a new code, please contact your [facility] or call [phone] for assistance.        Care EveryWhere ID     This is your Care EveryWhere ID. This could be used by other organizations to access your [place] medical records  [id number]        Your Vitals Were     Height BMI (Body Mass Index)                1.702 m (5' 7\") 22.71 kg/m2           Blood Pressure from Last 3 Encounters:   18 103/66   18 110/60   17 124/69    Weight from Last 3 Encounters:   18 65.8 kg (145 lb)   18 65.8 kg (145 lb) "   02/13/18 64.4 kg (142 lb)               Primary Care Provider Office Phone # Fax #    DAVID [facility] [phone] [address] [facility] [address]        Equal Access to Services     DIMITRY RAMÍREZ : Hadii aad ku hadasho Soomaali, waaxda luqadaha, qaybta kaalmada adeegyada, waxay idiin hayaan adeeg khfedesh larajni . So [facility] [phone].    ATENCIÓN: Si habla español, tiene a farias disposición servicios gratuitos de asistencia lingüística. Llame al [phone].    We comply with applicable federal civil rights laws and [place] laws. We do not discriminate on the basis of race, color, national origin, age, disability, sex, sexual orientation, or gender identity.            Thank you!     Thank you for choosing [facility]  for your care. Our goal is always to provide you with excellent care. Hearing back from our patients is one way we can continue to improve our services. Please take a few minutes to complete the written survey that you may receive in the mail after your visit with us. Thank you!             Your Updated Medication List - Protect others around you: Learn how to safely use, store and throw away your medicines at www.disposemymeds.org.          This list is accurate as of 2/26/18  3:33 PM.  Always use your most recent med list.                   Brand Name Dispense Instructions for use Diagnosis    sotalol 80 MG tablet    BETAPACE     Take 80 mg by mouth 2 times daily        ZANTAC PO      Reported on 5/3/2017

## 2018-02-26 NOTE — PROGRESS NOTES
"HISTORY OF PRESENT ILLNESS  Mr. Wiley is a pleasant 37 year old year old male who presents to clinic today with chronic low back pain  Atilio explains that he has not had any imaging ever done on his back. 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 sometimes 6 days per week  Occasionally gets symptoms into his legs from his low back  Location: low back  Quality:  achy pain    Severity: 4/10 at worst    Duration: yeasrs  Timing: occurs intermittently  Modifying factors:  resting and non-use makes it better, movement and use makes it worse  Associated signs & symptoms: sometimes radiation of pain into legs  He also reports some chronic neck pain that can radiate into his left shoulder at times- this is not as severe as the low back, but still causes pain  Additional history: as documented    MEDICAL HISTORY  Patient Active Problem List   Diagnosis     Paroxysmal a-fib (H)     CARDIOVASCULAR SCREENING; LDL GOAL LESS THAN 160     Elevated prolactin level (H)     Constipation     Erectile dysfunction     Gastroesophageal reflux disease without esophagitis       Current Outpatient Prescriptions   Medication Sig Dispense Refill     sotalol (BETAPACE) 80 MG tablet Take 80 mg by mouth 2 times daily       ZANTAC OR Reported on 5/3/2017         No Known Allergies    No family history on file.    Additional medical/Social/Surgical histories reviewed in [facility] and updated as appropriate.     REVIEW OF SYSTEMS (2/26/2018)  10 point ROS of systems including Constitutional, Eyes, Respiratory, Cardiovascular, Gastroenterology, Genitourinary, Integumentary, Musculoskeletal, Psychiatric were all negative except for pertinent positives noted in my HPI.     PHYSICAL EXAM  Vitals:    02/26/18 1418   Weight: 65.8 kg (145 lb)   Height: 1.702 m (5' 7\")     Vital Signs: Ht 1.702 m (5' 7\")  Wt 65.8 kg (145 lb)  BMI 22.71 kg/m2 Patient declined being weighed. Body mass index is 22.71 kg/(m^2).    General  - normal appearance, in no " obvious distress  CV  - normal peripheral perfusion  Pulm  - normal respiratory pattern, non-labored  Musculoskeletal - lumbar spine  - stance: normal gait without limp, no obvious leg length discrepancy, normal heel and toe walk  - inspection: normal bone and joint alignment, no obvious scoliosis  - palpation: no paravertebral or bony tenderness  - ROM: flexion exacerbates pain in L4/5 area of paraspinal muscles, normal extension, sidebending, rotation  - strength: lower extremities 5/5 in all planes  - special tests:  (-) straight leg raise  (+) slump test- some minor low back pain  Neuro  - patellar and Achilles DTRs 2+ bilaterally, NO lower extremity sensory deficit throughout L5 distribution, grossly normal coordination, normal muscle tone  Skin  - no ecchymosis, erythema, warmth, or induration, no obvious rash  Psych  - interactive, appropriate, normal mood and affect  Cervical spine: has some pain with ROM testing on left side of neck and trapezius muscles, mild positive spurlings on left    ASSESSMENT & PLAN  36 yo male with lumbar disc herniation, and cervical pain due to possible disc buldges  xrays cervical and lumbar reviewed- shows disc space narrowing moreso in lumbar spine  Ordered lumbar MRI will revisit after and discuss options for Thais  Lidocaine patches    Cirilo Rogers MD, CAQSM

## 2018-02-26 NOTE — PROGRESS NOTES
Otolaryngology Adult Consultation    Patient: Atilio Wiley  : 1980      HPI:  Atilio Wiley is a 37 year old male seen today in the Otolaryngology Clinic for nasal pain.  Patient reports that he has had nasal pain for approximately 15 years. Pain is specifically located on his left nasal bone. When he pushes on it very tender and uncomfortable. It tends to alleviate when he pushes the tip of his nose to the left. His past surgical history is significant for history of a septoplasty as well as a left maxillary sinus procedure in . This was done in [place]. He reports that these procedures were done in an effort to try to improve his pain. He reports that he had no change after the surgeries. He has seen another ENT in  who was not able to find any cause for the pain. Previous CT scans that he has done by report have all shown no evidence of masses. He does also have chronic abdominal pain. He does have a positive MARYANN. ESR and CRP are normal. He does have a referral for rheumatology. He's holding on that until after he's met with GI. 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    Medications:  Current Outpatient Rx   Medication Sig Dispense Refill     sotalol (BETAPACE) 80 MG tablet Take 80 mg by mouth 2 times daily       [facility] OR Reported on 5/3/2017         Allergies: Review of patient's allergies indicates no known allergies.     PMH:  Past Medical History:   Diagnosis Date     A-fib (H)      Deviated nasal septum     left sided maxillary facial pain       PSH:  Past Surgical History:   Procedure Laterality Date     SINUS SURGERY         FH:  No family history on file.     SH:  Social History   Substance Use Topics     Smoking status: Never Smoker     Smokeless tobacco: Never Used     Alcohol use No       Review of Systems  No flowsheet data found.    Physical Exam:    GEN:  The patient is alert, oriented and in no acute  distress.  HEAD:  Head, face scalp is grossly normal.  EARS:  Ears demonstrate normal canals, auricles and tympanic membranes.  NOSE:  Externally the nose is widened at the middle third. Palpation of the nasal bones reveals that they are stable. I'm not able to feel any masses externally. Intranasally the mucosa is very healthy. Septum is still slightly deviated to the left. There is no septal perforation.   ORAL:  Oral cavity shows healthy mucosa with out ulceration, masses or other lesions                involving the tongue, palate, buccal mucosa, floor of mouth or gingiva.                   Assessment/Plan:patient presents with left chronic nasal pain. He is quite frustrated and understandably in that he's never been able to get a etiology for his nasal pain. My examination today shows no significant structural abnormalities. I see no masses or lesions. The tissues overall looked very healthy. 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 It's possible with an positive PAMELA that he also might have Wegener's. However his nasal exam is quite healthy appearing on the inside. There is not a lot of crusting or septal cartilage deformation that would be suggestive of Wegener's. Patient is wondering if additional procedures to correct the deviated septum would improve his facial pain. I discussed that this would be an evaluation and assessment that I would recommend a facial plastic surgeon make. However my opinion would be that it's unlikely that additional procedure would be beneficial.  I offered him a referral to meet with donor facial plastic surgeons the patient declined. At this time patient has left nasal pain of which etiology is unknown. It is possible that there might be a neurologic origin to it such as trigeminal neuralgia. I do not think additional imaging is necessary at this time.    I spent a total of 35 minutes face-to-face with Atilio Wiley during  today's office visit.  Over 50% of this time was spent counseling the patient on and/or coordinating care as documented in my assessment and plan.

## 2018-02-26 NOTE — LETTER
2/26/2018         RE: Atilio Wiley  [address] [facility] [address]        Dear Colleague,    Thank you for referring your patient, Atilio Wiley, to the [facility]. Please see a copy of my visit note below.    HISTORY OF PRESENT ILLNESS  Mr. Wiley is a pleasant 37 year old year old male who presents to clinic today with chronic low back pain  Atilio explains that he has not had any imaging ever done on his back. 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 sometimes 6 days per week  Occasionally gets symptoms into his legs from his low back  Location: low back  Quality:  achy pain    Severity: 4/10 at worst    Duration: yeasrs  Timing: occurs intermittently  Modifying factors:  resting and non-use makes it better, movement and use makes it worse  Associated signs & symptoms: sometimes radiation of pain into legs  He also reports some chronic neck pain that can radiate into his left shoulder at times- this is not as severe as the low back, but still causes pain  Additional history: as documented    MEDICAL HISTORY  Patient Active Problem List   Diagnosis     Paroxysmal a-fib (H)     CARDIOVASCULAR SCREENING; LDL GOAL LESS THAN 160     Elevated prolactin level (H)     Constipation     Erectile dysfunction     Gastroesophageal reflux disease without esophagitis       Current Outpatient Prescriptions   Medication Sig Dispense Refill     sotalol (BETAPACE) 80 MG tablet Take 80 mg by mouth 2 times daily       [facility] OR Reported on 5/3/2017         No Known Allergies    No family history on file.    Additional medical/Social/Surgical histories reviewed in [facility] and updated as appropriate.     REVIEW OF SYSTEMS (2/26/2018)  10 point ROS of systems including Constitutional, Eyes, Respiratory, Cardiovascular, Gastroenterology, Genitourinary, Integumentary, Musculoskeletal, Psychiatric were all negative except for pertinent positives noted in my HPI.     PHYSICAL EXAM  Vitals:    02/26/18 1418  "  Weight: 65.8 kg (145 lb)   Height: 1.702 m (5' 7\")     Vital Signs: Ht 1.702 m (5' 7\")  Wt 65.8 kg (145 lb)  BMI 22.71 kg/m2 Patient declined being weighed. Body mass index is 22.71 kg/(m^2).    General  - normal appearance, in no obvious distress  CV  - normal peripheral perfusion  Pulm  - normal respiratory pattern, non-labored  Musculoskeletal - lumbar spine  - stance: normal gait without limp, no obvious leg length discrepancy, normal heel and toe walk  - inspection: normal bone and joint alignment, no obvious scoliosis  - palpation: no paravertebral or bony tenderness  - ROM: flexion exacerbates pain in L4/5 area of paraspinal muscles, normal extension, sidebending, rotation  - strength: lower extremities 5/5 in all planes  - special tests:  (-) straight leg raise  (+) slump test- some minor low back pain  Neuro  - patellar and Achilles DTRs 2+ bilaterally, NO lower extremity sensory deficit throughout L5 distribution, grossly normal coordination, normal muscle tone  Skin  - no ecchymosis, erythema, warmth, or induration, no obvious rash  Psych  - interactive, appropriate, normal mood and affect  Cervical spine: has some pain with ROM testing on left side of neck and trapezius muscles, mild positive spurlings on left    ASSESSMENT & PLAN  36 yo male with lumbar disc herniation, and cervical pain due to possible disc buldges  xrays cervical and lumbar reviewed- shows disc space narrowing moreso in lumbar spine  Ordered lumbar MRI will revisit after and discuss options for Thais  Lidocaine patches    Cirilo Rogers MD, CA[facility]. [facility]    Again, thank you for allowing me to participate in the care of your patient.        Sincerely,        Cirilo Rogers MD    "

## 2018-02-26 NOTE — NURSING NOTE
"Atilio Wiley's goals for this visit include:Low back/tailbone evaluation   He requests these members of his care team be copied on today's visit information: no    PCP: Chana Poon    Referring Provider:  No referring provider defined for this encounter.    Chief Complaint   Patient presents with     Back Pain     Low back pain on occasion with some activity.       Initial Ht 1.702 m (5' 7\")  Wt 65.8 kg (145 lb)  BMI 22.71 kg/m2 Estimated body mass index is 22.71 kg/(m^2) as calculated from the following:    Height as of this encounter: 1.702 m (5' 7\").    Weight as of this encounter: 65.8 kg (145 lb).  Medication Reconciliation: complete  "

## 2018-03-01 NOTE — TELEPHONE ENCOUNTER
Patient is called back. He is advised of appointment scheduling process of referral from his PCP to Rheumatology. Patient voices he doesn't know why or who told him he should see Rheumatology. He states he has bumps on his hands and wants to know if that is Lupus. Advised him he needs to be seen for evaluation, and if his PCP would like him to be seen they can assist with that referral to our clinic. He stated ok and thank you for the call and hung up.

## 2018-03-08 ENCOUNTER — RADIANT APPOINTMENT (OUTPATIENT)
Dept: MRI IMAGING | Facility: CLINIC | Age: 38
End: 2018-03-08
Attending: PREVENTIVE MEDICINE
Payer: COMMERCIAL

## 2018-03-08 DIAGNOSIS — M51.26 LUMBAR DISC HERNIATION: ICD-10-CM

## 2018-03-08 PROCEDURE — 72148 MRI LUMBAR SPINE W/O DYE: CPT | Performed by: RADIOLOGY

## 2018-03-12 ENCOUNTER — TELEPHONE (OUTPATIENT)
Dept: OTOLARYNGOLOGY | Facility: CLINIC | Age: 38
End: 2018-03-12

## 2018-03-13 ENCOUNTER — OFFICE VISIT (OUTPATIENT)
Dept: ORTHOPEDICS | Facility: CLINIC | Age: 38
End: 2018-03-13
Payer: COMMERCIAL

## 2018-03-13 VITALS
HEIGHT: 67 IN | HEART RATE: 55 BPM | SYSTOLIC BLOOD PRESSURE: 105 MMHG | BODY MASS INDEX: 22.76 KG/M2 | DIASTOLIC BLOOD PRESSURE: 55 MMHG | WEIGHT: 145 LBS

## 2018-03-13 DIAGNOSIS — M51.26 LUMBAR DISC HERNIATION: Primary | ICD-10-CM

## 2018-03-13 PROCEDURE — 99213 OFFICE O/P EST LOW 20 MIN: CPT | Performed by: PREVENTIVE MEDICINE

## 2018-03-13 RX ORDER — MAGNESIUM HYDROXIDE/ALUMINUM HYDROXICE/SIMETHICONE 120; 1200; 1200 MG/30ML; MG/30ML; MG/30ML
30 SUSPENSION ORAL EVERY 4 HOURS PRN
COMMUNITY

## 2018-03-13 ASSESSMENT — PAIN SCALES - GENERAL: PAINLEVEL: MODERATE PAIN (5)

## 2018-03-13 NOTE — TELEPHONE ENCOUNTER
"Pt called and left a message on my VM requesting to speak someone about his rpevious appt with Dr Lorenzana.   Pt called and states that he was upset due to he did not feel he got a \"good visit\". He was upset noting that the provider only spent 2 minutes with him and did not offer up any solutions or ideas to what may be causing his pain issues. Discussed with pt what provider stated in his progress visit note and discussed with pt that I did personally speak with Dr Lorenzana regarding his visit. Discussed that Dr Lorenzana could not find a reason (see  Note) for his pain and suggested that he continue his follow up with Rheumatology and did offer pt a referral to see Dr Braswell the facial plasty MD. Pt declined. Dr Lorenzana does state in her note that she spent 35 mins speaking with pt about options and her assessment during that visit. Pt offered appt with Dr Braswell during this call again and continues to decline. Pt also offered to speak with Dr Lorenzana to get a better understanding of what was discussed at visit and pt declined. Pt requesting to dispute his bill. Dispute contact given.   "

## 2018-03-13 NOTE — LETTER
"    3/13/2018         RE: Atilio Wiley  [address] [place] [id number]        Dear Colleague,    Thank you for referring your patient, Atilio Wiley, to the [facility]. Please see a copy of my visit note below.    HISTORY OF PRESENT ILLNESS  Mr. Wiley is a pleasant 37 year old year old male who presents to clinic today with ongoing low back pain  Had a lumbar MRI and here to discuss.  Would like to consider injection if possible. Continues to have low back pain    MEDICAL HISTORY  Patient Active Problem List   Diagnosis     Paroxysmal a-fib (H)     CARDIOVASCULAR SCREENING; LDL GOAL LESS THAN 160     Elevated prolactin level (H)     Constipation     Erectile dysfunction     Gastroesophageal reflux disease without esophagitis       Current Outpatient Prescriptions   Medication Sig Dispense Refill     tiZANidine (ZANAFLEX) 4 MG tablet Take 1-2 tablets (4-8 mg) by mouth nightly as needed 30 tablet 1     sotalol (BETAPACE) 80 MG tablet Take 80 mg by mouth 2 times daily       [facility] OR Reported on 5/3/2017         No Known Allergies    No family history on file.    Additional medical/Social/Surgical histories reviewed in [facility] and updated as appropriate.     REVIEW OF SYSTEMS (3/13/2018)  10 point ROS of systems including Constitutional, Eyes, Respiratory, Cardiovascular, Gastroenterology, Genitourinary, Integumentary, Musculoskeletal, Psychiatric were all negative except for pertinent positives noted in my HPI.     PHYSICAL EXAM  Vitals:    03/13/18 0707   BP: 105/55   Pulse: 55   Weight: 65.8 kg (145 lb)   Height: 1.702 m (5' 7\")     Vital Signs: /55  Pulse 55  Ht 1.702 m (5' 7\")  Wt 65.8 kg (145 lb)  BMI 22.71 kg/m2 Patient declined being weighed. Body mass index is 22.71 kg/(m^2).    General  - normal appearance, in no obvious distress  CV  - normal peripheral perfusion  Pulm  - normal respiratory pattern, non-labored  Musculoskeletal - lumbar spine  - stance: normal gait without limp, " no obvious leg length discrepancy, normal heel and toe walk  - inspection: normal bone and joint alignment, no obvious scoliosis  - palpation: no paravertebral or bony tenderness  - ROM: flexion exacerbates pain, abnormal extension, sidebending, rotation are limited by pain  - strength: lower extremities 5/5 in all planes  - special tests:  (+) straight leg raise- low back pain  (+) slump test- low back pain  Neuro  - patellar and Achilles DTRs 2+ bilaterally,  Bilateral lower extremity sensory deficit throughout L5 distribution, grossly normal coordination, normal muscle tone  Skin  - no ecchymosis, erythema, warmth, or induration, no obvious rash  Psych  - interactive, appropriate, normal mood and affect    ASSESSMENT & PLAN  36 yo male with lumbar ddd, herniated discs  Reviewed MRI: shows lumbar ddd  Ordered JAMIE  F/u 2 weeks later        Cirilo Rogers MD, CAQSM      Again, thank you for allowing me to participate in the care of your patient.        Sincerely,        Cirilo Rogers MD

## 2018-03-13 NOTE — PATIENT INSTRUCTIONS
Thanks for coming today.  Ortho/Sports Medicine Clinic  [address] [place] [address]    To schedule future appointments in Ortho Clinic, you may call [phone].    To schedule ordered imaging by your provider:   Call [facility] [phone]    To schedule an injection ordered by your provider:  Call [facility] Injection scheduling line: [phone]  Patternshart available online at:  [email or web address]    Please call if any further questions or concerns ([phone]).  Clinic hours 8 am to 5 pm.    Return to clinic (call) if symptoms worsen or fail to improve.

## 2018-03-13 NOTE — PROGRESS NOTES
"HISTORY OF PRESENT ILLNESS  Mr. Wiley is a pleasant 37 year old year old male who presents to clinic today with ongoing low back pain  Had a lumbar MRI and here to discuss.  Would like to consider injection if possible. Continues to have low back pain    MEDICAL HISTORY  Patient Active Problem List   Diagnosis     Paroxysmal a-fib (H)     CARDIOVASCULAR SCREENING; LDL GOAL LESS THAN 160     Elevated prolactin level (H)     Constipation     Erectile dysfunction     Gastroesophageal reflux disease without esophagitis       Current Outpatient Prescriptions   Medication Sig Dispense Refill     tiZANidine (ZANAFLEX) 4 MG tablet Take 1-2 tablets (4-8 mg) by mouth nightly as needed 30 tablet 1     sotalol (BETAPACE) 80 MG tablet Take 80 mg by mouth 2 times daily       [facility]AC OR Reported on 5/3/2017         No Known Allergies    No family history on file.    Additional medical/Social/Surgical histories reviewed in [facility] and updated as appropriate.     REVIEW OF SYSTEMS (3/13/2018)  10 point ROS of systems including Constitutional, Eyes, Respiratory, Cardiovascular, Gastroenterology, Genitourinary, Integumentary, Musculoskeletal, Psychiatric were all negative except for pertinent positives noted in my HPI.     PHYSICAL EXAM  Vitals:    03/13/18 0707   BP: 105/55   Pulse: 55   Weight: 65.8 kg (145 lb)   Height: 1.702 m (5' 7\")     Vital Signs: /55  Pulse 55  Ht 1.702 m (5' 7\")  Wt 65.8 kg (145 lb)  BMI 22.71 kg/m2 Patient declined being weighed. Body mass index is 22.71 kg/(m^2).    General  - normal appearance, in no obvious distress  CV  - normal peripheral perfusion  Pulm  - normal respiratory pattern, non-labored  Musculoskeletal - lumbar spine  - stance: normal gait without limp, no obvious leg length discrepancy, normal heel and toe walk  - inspection: normal bone and joint alignment, no obvious scoliosis  - palpation: no paravertebral or bony tenderness  - ROM: flexion exacerbates pain, abnormal extension, " sidebending, rotation are limited by pain  - strength: lower extremities 5/5 in all planes  - special tests:  (+) straight leg raise- low back pain  (+) slump test- low back pain  Neuro  - patellar and Achilles DTRs 2+ bilaterally,  Bilateral lower extremity sensory deficit throughout L5 distribution, grossly normal coordination, normal muscle tone  Skin  - no ecchymosis, erythema, warmth, or induration, no obvious rash  Psych  - interactive, appropriate, normal mood and affect    ASSESSMENT & PLAN  36 yo male with lumbar ddd, herniated discs  Reviewed MRI: shows lumbar ddd  Ordered JAMIE  F/u 2 weeks later        Cirilo Rogers MD, CAQSM

## 2018-03-20 ENCOUNTER — SURGERY (OUTPATIENT)
Age: 38
End: 2018-03-20

## 2018-03-20 ENCOUNTER — HOSPITAL ENCOUNTER (OUTPATIENT)
Facility: AMBULATORY SURGERY CENTER | Age: 38
Discharge: HOME | End: 2018-03-20
Attending: INTERNAL MEDICINE | Admitting: INTERNAL MEDICINE
Payer: COMMERCIAL

## 2018-03-20 VITALS
RESPIRATION RATE: 16 BRPM | OXYGEN SATURATION: 100 % | SYSTOLIC BLOOD PRESSURE: 92 MMHG | HEART RATE: 64 BPM | DIASTOLIC BLOOD PRESSURE: 58 MMHG | TEMPERATURE: 97.8 F

## 2018-03-20 LAB — UPPER GI ENDOSCOPY: NORMAL

## 2018-03-20 PROCEDURE — G8907 PT DOC NO EVENTS ON DISCHARG: HCPCS

## 2018-03-20 PROCEDURE — 43239 EGD BIOPSY SINGLE/MULTIPLE: CPT

## 2018-03-20 PROCEDURE — 88305 TISSUE EXAM BY PATHOLOGIST: CPT | Performed by: FAMILY MEDICINE

## 2018-03-20 PROCEDURE — G8918 PT W/O PREOP ORDER IV AB PRO: HCPCS

## 2018-03-20 RX ORDER — LIDOCAINE 40 MG/G
CREAM TOPICAL
Status: DISCONTINUED | OUTPATIENT
Start: 2018-03-20 | End: 2018-03-21 | Stop reason: HOSPADM

## 2018-03-20 RX ORDER — FENTANYL CITRATE 50 UG/ML
INJECTION, SOLUTION INTRAMUSCULAR; INTRAVENOUS PRN
Status: DISCONTINUED | OUTPATIENT
Start: 2018-03-20 | End: 2018-03-20 | Stop reason: HOSPADM

## 2018-03-20 RX ORDER — ONDANSETRON 2 MG/ML
4 INJECTION INTRAMUSCULAR; INTRAVENOUS
Status: DISCONTINUED | OUTPATIENT
Start: 2018-03-20 | End: 2018-03-21 | Stop reason: HOSPADM

## 2018-03-20 RX ADMIN — FENTANYL CITRATE 100 MCG: 50 INJECTION, SOLUTION INTRAMUSCULAR; INTRAVENOUS at 08:40

## 2018-03-22 LAB — COPATH REPORT: NORMAL

## 2018-03-29 ENCOUNTER — TELEPHONE (OUTPATIENT)
Dept: UROLOGY | Facility: CLINIC | Age: 38
End: 2018-03-29

## 2018-03-29 NOTE — TELEPHONE ENCOUNTER
Nurse called patient, he said he needs to schedule a test that Shira Pack requested at the last visit. States its to fill his bladder up and release urine out. Urology nurse is off today and informed him that she can look into this tomorrow and call him to schedule. He verbalized understanding.    Carie Sanches RN, BSN  [facility]  GI/Gen Surg/Hepatology Care Coordinator

## 2018-03-29 NOTE — TELEPHONE ENCOUNTER
[facility]    Phone Message    May a detailed message be left on voicemail: yes    Reason for Call: Patient called to make an appointment with Shira Pack. He seems very incoherent at the time of call. He stated he needed to follow up with her and that he also needed a procedure done as well. He is requesting a call back from a nurse to further clarify.    Action Taken: Message routed to:  Adult Clinics: Urology p [id number]

## 2018-03-30 NOTE — TELEPHONE ENCOUNTER
Patient may be referencing simple uroflow which can be performed at the time of an office visit, but he refused this at his last office visit.    Ultimately, full urodynamic testing is indicated for this patient given his long history of voiding difficulties. Recommend scheduling next available then follow up in clinic to discuss results.    Shira Pack PA-C  Urology

## 2018-03-30 NOTE — TELEPHONE ENCOUNTER
Returned call to patient who was informed of the note below from Shira Pack PA-C. Patient verbalized understanding. Telephone number for [facility] provided to patient. Patient plans to contact to schedule urodynamics. Patient aware that it is recommended to schedule a follow up visit with Shira Pack PA-C to discuss urodynamic results.    Kaylah Roman RN, BSN

## 2018-03-30 NOTE — TELEPHONE ENCOUNTER
"Discussed messages below with Shira Pack PA-C who is in clinic today. Per Shira Pack PA-C, patient can complete urodynamics testing.     Called and spoke to patient who stated, \"I wasn't requesting a call back.\" Informed patient of note below from covering RN and that writer was following up. Patient stated, \"I guess I am not understanding. Shira had offered to do the test when I was there the last time at clinic. Can you just have Shira call me about this?\" Informed patient that a message would be sent to Shira to clarify and with request to contact patient verbalized understanding and was comfortable with plan.    Kaylah Roman RN, BSN    "

## 2018-04-05 ENCOUNTER — RADIANT APPOINTMENT (OUTPATIENT)
Dept: GENERAL RADIOLOGY | Facility: CLINIC | Age: 38
End: 2018-04-05
Attending: PREVENTIVE MEDICINE
Payer: COMMERCIAL

## 2018-04-05 VITALS — SYSTOLIC BLOOD PRESSURE: 109 MMHG | OXYGEN SATURATION: 99 % | DIASTOLIC BLOOD PRESSURE: 63 MMHG | HEART RATE: 61 BPM

## 2018-04-05 PROCEDURE — 62323 NJX INTERLAMINAR LMBR/SAC: CPT | Performed by: RADIOLOGY

## 2018-04-05 RX ORDER — BUPIVACAINE HYDROCHLORIDE 5 MG/ML
10 INJECTION, SOLUTION EPIDURAL; INTRACAUDAL ONCE
Status: COMPLETED | OUTPATIENT
Start: 2018-04-05 | End: 2018-04-05

## 2018-04-05 RX ORDER — IOPAMIDOL 408 MG/ML
10 INJECTION, SOLUTION INTRATHECAL ONCE
Status: COMPLETED | OUTPATIENT
Start: 2018-04-05 | End: 2018-04-05

## 2018-04-05 RX ORDER — METHYLPREDNISOLONE ACETATE 80 MG/ML
80 INJECTION, SUSPENSION INTRA-ARTICULAR; INTRALESIONAL; INTRAMUSCULAR; SOFT TISSUE ONCE
Status: COMPLETED | OUTPATIENT
Start: 2018-04-05 | End: 2018-04-05

## 2018-04-05 RX ADMIN — IOPAMIDOL 2 ML: 408 INJECTION, SOLUTION INTRATHECAL at 14:41

## 2018-04-05 RX ADMIN — BUPIVACAINE HYDROCHLORIDE 20 MG: 5 INJECTION, SOLUTION EPIDURAL; INTRACAUDAL at 14:41

## 2018-04-05 RX ADMIN — METHYLPREDNISOLONE ACETATE 80 MG: 80 INJECTION, SUSPENSION INTRA-ARTICULAR; INTRALESIONAL; INTRAMUSCULAR; SOFT TISSUE at 14:42

## 2018-04-05 NOTE — PROGRESS NOTES
: Atilio Wiley was seen in X-ray today for a lumbar epidural injection. Patient rated pain before procedure 2-3/10. After procedure patient rated pain at a zero?/10, he was not sure. This pain level is acceptable to patient. Patient discharged home with Maxi.

## 2018-04-05 NOTE — DISCHARGE SUMMARY
AFTER YOU GO HOME    ? DO relax; minimize your activity for 24 hours  ? You may resume normal activity tomorrow  ? You may remove the bandage in the evening or next morning  ? You may resume bathing the next day  ? Drink at least 4 extra glasses of fluid today if not on fluid restrictions  ? DO NOT drive or operate machinery at home or at work for at least 24 hours      VISIT THE EMERGENCY ROOM OR URGENT CARE IF:    ? There is redness or swelling at the injection site  ? There is discharge from the injection site  ? You develop a temperature of 101  F or greater      ADDITIONAL INSTRUCTIONS:     ? You may resume your Coumadin or other blood thinner at your regular dose today.  Follow up with your physician to have your INR rechecked if indicated.  ? If you gain no relief from the injection after two (2) weeks, follow-up with your provider for your options.        Contacts:    During business hours from 8 to 5 pm, you may call [phone] to reach a nurse advisor at [facility].  After hours, call [facility]  [phone].  Ask for the Radiologist on-call.  Someone is on-call 24 hrs/day.  [facility] Toll Free Number   [phone]

## 2018-04-11 ENCOUNTER — ALLIED HEALTH/NURSE VISIT (OUTPATIENT)
Dept: UROLOGY | Facility: CLINIC | Age: 38
End: 2018-04-11
Payer: COMMERCIAL

## 2018-04-11 DIAGNOSIS — N31.8 LOW COMPLIANCE BLADDER: Primary | ICD-10-CM

## 2018-04-11 DIAGNOSIS — N39.8 VOIDING DYSFUNCTION: ICD-10-CM

## 2018-04-11 PROCEDURE — 51728 CYSTOMETROGRAM W/VP: CPT | Mod: TC | Performed by: UROLOGY

## 2018-04-11 PROCEDURE — 51784 ANAL/URINARY MUSCLE STUDY: CPT | Mod: TC | Performed by: UROLOGY

## 2018-04-11 PROCEDURE — 51797 INTRAABDOMINAL PRESSURE TEST: CPT | Mod: TC | Performed by: UROLOGY

## 2018-04-11 PROCEDURE — 51798 US URINE CAPACITY MEASURE: CPT | Mod: TC | Performed by: UROLOGY

## 2018-04-11 PROCEDURE — 51741 ELECTRO-UROFLOWMETRY FIRST: CPT | Mod: TC | Performed by: UROLOGY

## 2018-04-11 NOTE — NURSING NOTE
SUBJECTIVE:  Atilio Wiley is a 37 year old male referred to me for Urodynamic Study by Shira CASE . Supervising physician today is Dr. Bell  Current Outpatient Prescriptions   Medication Sig Dispense Refill     tiZANidine (ZANAFLEX) 4 MG tablet Take 1-2 tablets (4-8 mg) by mouth nightly as needed 30 tablet 1     Polyethylene Glycol 3350 (MIRALAX PO)        alum & mag hydroxide-simethicone (MYLANTA) 200-200-20 MG/5ML SUSP suspension Take 30 mLs by mouth every 4 hours as needed for indigestion       sotalol (BETAPACE) 80 MG tablet Take 80 mg by mouth 2 times daily       ZANTAC OR Reported on 5/3/2017       Allergies: No Known Allergies  Chief Complaint:  Chief Complaint   Patient presents with     Urinary Problem     Patient is here for a Urodynamic study     History pertinent to this evaluation:   Patient is troubled by constipation  History of:  Multilevel lumbar spondylosis and Lumbar foraminal stenosis  No history of urinary incontinence  Nocturia 5 times per night  Double Voiding  Urgency   Frequency ( patient states that he has to urinate every 5 minutes some days)  Past Surgical History:   Procedure Laterality Date     COMBINED ESOPHAGOSCOPY, GASTROSCOPY, DUODENOSCOPY (EGD) WITH CO2 INSUFFLATION N/A 3/20/2018    Procedure: COMBINED ESOPHAGOSCOPY, GASTROSCOPY, DUODENOSCOPY (EGD) WITH CO2 INSUFFLATION;  edg;  Surgeon: Duane, William Charles, MD;  Location:  OR     ESOPHAGOSCOPY, GASTROSCOPY, DUODENOSCOPY (EGD), COMBINED N/A 3/20/2018    Procedure: COMBINED ESOPHAGOSCOPY, GASTROSCOPY, DUODENOSCOPY (EGD), BIOPSY SINGLE OR MULTIPLE;;  Surgeon: Duane, William Charles, MD;  Location:  OR     SINUS SURGERY         Informed consent given by the patient for this procedure.  Catheter is placed without difficulty or resistance.    Impression:  Free flow: Voided volume is 248 mls.  The Qmax is slightly decreased at 8.4 ml/sec.  The flow pattern is sawtooth. The PVR is 8 mls.    Bladder filling study.  The  filling study is run multiple times for reproducibility. The bladder sensations are all slightly early. The first bladder sensation is at 82 mls infused. The second bladder sensation is at 158 mls infused and the third bladder sensation is at 164 mls infused. Both the 2nd and the 3rd bladder sensations are in conjunction with a steady rise in bladder pressure. This initiated a pressure flow study, however the patient could not void so he agreed that we could continue to fill the bladder. When the infusion is stopped the bladder pressures return to nearly baseline . When the infusion begins again there is a steady rise in bladder pressure. When the bladder pressure reaches 38 CmH20, the patient insists that he must void. He is clearly uncomfortable and his upper legs and buttock have tightened. He is allowed to empty his bladder. The voided amount is 192 mls. This is a small bladder due to the low bladder compliance. The bladder pressures return nearly to baseline and I decided that we should fill the bladder again so that we can do a pressure flow study. On this 3rd bladder filling there is rectal peristalsis.  Once again there is a steady rise in bladder pressure and when the bladder pressure has reached 20 cmH20 the patient again says that he must void.     Pressure flow study: The bladder pressures start out at 23 cmH20.  The flow pattern is low as well as a low Qmax at 5.0 ml/sec. The detrusor pressure actually lowers when the void begins and never reaches higher than 25cH20.  The patient voids 144mls. There is abdominal artifact, and there is tension is the pelvic floor and legs, despite the fact that the EMG remains relatively quite. There is 160 mls removed from the bladder at the end of the filling study. However, we can see that he did empty his bladder on his free flow.     PLAN:  Patient was given verbal information on normal urinary patterns, controlling urgency and frequency, and healthy fluid  consumption during the day. I explained that it can be a natural response to have pelvic tension when there is increased pressure in the bladder when in the storing phase. We talked extensively about pelvic floor relaxation during voiding.  Verbal and written information given on discharge instructions.  Patient will follow up in clinic with Nicole Pack. They will go over results of Urodynamic study and determine treatment plan. Maureen DUVALL RN

## 2018-04-11 NOTE — MR AVS SNAPSHOT
"              After Visit Summary   4/11/2018    Atilio Wiley    MRN: [id number]           Patient Information     Date Of Birth          1980        Visit Information        Provider Department      4/11/2018 8:30 AM Clinic, Urodynamics [facility]        Today's Diagnoses     Low compliance bladder    -  1    Voiding dysfunction           Follow-ups after your visit        Follow-up notes from your care team     Return in 1 week (on 4/18/2018) for Follow-up and results.      Your next 10 appointments already scheduled     Apr 13, 2018  4:00 PM CDT   [facility]    [address] [place] [id number] [phone]            May 02, 2018  3:00 PM CDT   New Visit with Dina Rodriguez MD   [facility]    [address] [place] [id number] [phone]              Who to contact     If you have questions or need follow up information about today's clinic visit or your schedule please contact [facility] directly at [phone].  Normal or non-critical lab and imaging results will be communicated to you by MyChart, letter or phone within 4 business days after the clinic has received the results. If you do not hear from us within 7 days, please contact the clinic through MyChart or phone. If you have a critical or abnormal lab result, we will notify you by phone as soon as possible.  Submit refill requests through "[employer] or call your pharmacy and they will forward the refill request to us. Please allow 3 business days for your refill to be completed.          Additional Information About Your Visit        [employer] Information     "[employer] lets you send messages to your doctor, view your test results, renew your prescriptions, schedule appointments and more. To sign up, go to [email or web address] . Click on \"Log in\" on " "the left side of the screen, which will take you to the Welcome page. Then click on \"Sign up Now\" on the right side of the page.     You will be asked to enter the access code listed below, as well as some personal information. Please follow the directions to create your username and password.     Your access code is: [email or web address]  Expires: 2018  5:46 PM     Your access code will  in 90 days. If you need help or a new code, please call your [place] clinic or [phone].        Care EveryWhere ID     This is your Care EveryWhere ID. This could be used by other organizations to access your [place] medical records  [id number]         Blood Pressure from Last 3 Encounters:   18 109/63   18 92/58   18 105/55    Weight from Last 3 Encounters:   18 65.8 kg (145 lb)   18 65.8 kg (145 lb)   18 65.8 kg (145 lb)              We Performed the Following     ANAL/URINARY MUSCLE STUDY [00284]     CYSTOMETROGRAM COMPLEX W VOIDING PRESS PROFILE [56949]     ELECTRO-UROFLOWMETRY [76961]     INTRAABDOMINAL PRESSURE TEST [33710]        Primary Care Provider Office Phone # Fax #    DAVID Monahan [facility] [phone] [address] [facility] [address]        Equal Access to Services     DIMITRY RAMÍREZ AH: Hadii aad ku hadasho Soomaali, waaxda luqadaha, qaybta kaalmada adeegyada, delisa real hayabraham dale . So [facility] [phone].    ATENCIÓN: Si habla español, tiene a farias disposición servicios gratuitos de asistencia lingüística. Nico al [phone].    We comply with applicable federal civil rights laws and [place] laws. We do not discriminate on the basis of race, color, national origin, age, disability, sex, sexual orientation, or gender identity.            Thank you!     Thank you for choosing [facility]  for your care. Our goal is always to provide you with excellent care. Hearing back from our patients is one way we can continue to " improve our services. Please take a few minutes to complete the written survey that you may receive in the mail after your visit with us. Thank you!             Your Updated Medication List - Protect others around you: Learn how to safely use, store and throw away your medicines at www.disposemymeds.org.          This list is accurate as of 4/11/18 12:04 PM.  Always use your most recent med list.                   Brand Name Dispense Instructions for use Diagnosis    MIRALAX PO           MYLANTA 200-200-20 MG/5ML Susp suspension   Generic drug:  alum & mag hydroxide-simethicone      Take 30 mLs by mouth every 4 hours as needed for indigestion        sotalol 80 MG tablet    BETAPACE     Take 80 mg by mouth 2 times daily        tiZANidine 4 MG tablet    ZANAFLEX    30 tablet    Take 1-2 tablets (4-8 mg) by mouth nightly as needed    Lumbar disc herniation       ZANTAC PO      Reported on 5/3/2017

## 2018-04-13 ENCOUNTER — OFFICE VISIT (OUTPATIENT)
Dept: UROLOGY | Facility: CLINIC | Age: 38
End: 2018-04-13
Payer: COMMERCIAL

## 2018-04-13 VITALS
DIASTOLIC BLOOD PRESSURE: 63 MMHG | HEART RATE: 54 BPM | HEIGHT: 67 IN | WEIGHT: 143.9 LBS | BODY MASS INDEX: 22.58 KG/M2 | SYSTOLIC BLOOD PRESSURE: 99 MMHG

## 2018-04-13 DIAGNOSIS — K59.00 CONSTIPATION, UNSPECIFIED CONSTIPATION TYPE: ICD-10-CM

## 2018-04-13 DIAGNOSIS — R39.11 URINARY HESITANCY: ICD-10-CM

## 2018-04-13 DIAGNOSIS — N39.8 VOIDING DYSFUNCTION: ICD-10-CM

## 2018-04-13 DIAGNOSIS — R39.15 URINARY URGENCY: ICD-10-CM

## 2018-04-13 DIAGNOSIS — R35.0 URINARY FREQUENCY: Primary | ICD-10-CM

## 2018-04-13 PROCEDURE — 51784 ANAL/URINARY MUSCLE STUDY: CPT | Mod: 51 | Performed by: PHYSICIAN ASSISTANT

## 2018-04-13 PROCEDURE — 51741 ELECTRO-UROFLOWMETRY FIRST: CPT | Mod: 51 | Performed by: PHYSICIAN ASSISTANT

## 2018-04-13 PROCEDURE — 51728 CYSTOMETROGRAM W/VP: CPT | Performed by: PHYSICIAN ASSISTANT

## 2018-04-13 PROCEDURE — 51797 INTRAABDOMINAL PRESSURE TEST: CPT | Performed by: PHYSICIAN ASSISTANT

## 2018-04-13 RX ORDER — MIRABEGRON 25 MG/1
25 TABLET, FILM COATED, EXTENDED RELEASE ORAL DAILY
Qty: 30 TABLET | Refills: 5 | Status: SHIPPED | OUTPATIENT
Start: 2018-04-13

## 2018-04-13 ASSESSMENT — PAIN SCALES - GENERAL: PAINLEVEL: NO PAIN (0)

## 2018-04-13 NOTE — PATIENT INSTRUCTIONS
Pel[facility] Floor Center Contact Information: telephone [phone]    Viky Schmitt placed the referral

## 2018-04-13 NOTE — MR AVS SNAPSHOT
After Visit Summary   4/13/2018    Atilio Wiley    MRN: [id number]           Patient Information     Date Of Birth          1980        Visit Information        Provider Department      4/13/2018 4:00 PM Shira Pack PA-C [facility]        Today's Diagnoses     Urinary frequency    -  1    Urinary urgency          Care Instructions    Pel[facility] Floor Center Contact Information: telephone [phone]    Viky Schmitt placed the referral          Follow-ups after your visit        Your next 10 appointments already scheduled     Apr 27, 2018  4:30 PM CDT   Return Visit with Shira Pack PA-C   [facility]    [address] [place] [id number] [phone]            May 02, 2018  3:00 PM CDT   New Visit with Dina Rodriguez MD   [facility]    [address] [place] [id number] [phone]              Who to contact     If you have questions or need follow up information about today's clinic visit or your schedule please contact [facility] directly at [phone].  Normal or non-critical lab and imaging results will be communicated to you by MyChart, letter or phone within 4 business days after the clinic has received the results. If you do not hear from us within 7 days, please contact the clinic through MyChart or phone. If you have a critical or abnormal lab result, we will notify you by phone as soon as possible.  Submit refill requests through [employer] or call your pharmacy and they will forward the refill request to us. Please allow 3 business days for your refill to be completed.          Additional Information About Your Visit        [employer] Resources Information     [employer] is an electronic gateway that provides easy, online access to your medical records. With [employer], you can request a clinic appointment, read  "your test results, renew a prescription or communicate with your care team.     To sign up for [email or web address] visit the website at [email or web address]   You will be asked to enter the access code listed below, as well as some personal information. Please follow the directions to create your username and password.     Your access code is: [email or web address]  Expires: 2018  5:46 PM     Your access code will  in 90 days. If you need help or a new code, please contact your [facility] or call [phone] for assistance.        Care EveryWhere ID     This is your Care EveryWhere ID. This could be used by other organizations to access your [place] medical records  [id number]        Your Vitals Were     Pulse Height BMI (Body Mass Index)             54 1.702 m (5' 7\") 22.54 kg/m2          Blood Pressure from Last 3 Encounters:   18 99/63   18 109/63   18 92/58    Weight from Last 3 Encounters:   18 65.3 kg (143 lb 14.4 oz)   18 65.8 kg (145 lb)   18 65.8 kg (145 lb)              Today, you had the following     No orders found for display         Today's Medication Changes          These changes are accurate as of 18  5:03 PM.  If you have any questions, ask your nurse or doctor.               Start taking these medicines.        Dose/Directions    mirabegron 25 MG 24 hr tablet   Commonly known as:  MYRBETRIQ   Used for:  Urinary frequency, Urinary urgency   Started by:  Shira Pack PA-C        Dose:  25 mg   Take 1 tablet (25 mg) by mouth daily   Quantity:  30 tablet   Refills:  5            Where to get your medicines      These medications were sent to [place] [address] [place] [address]  [address] [place] [address]     Phone:  [phone]     mirabegron 25 MG 24 hr tablet                Primary Care Provider Office Phone # Fax #    DAVID Monahan [phone] [address] [facility]" [facility]  [facility] [address]        Equal Access to Services     [facility] : Hadii kayla ku hadbethanyo Soviraali, waaxda luqadaha, qaybta kaalmaabbi valle, delisa mcneilfedelyn borjas. So [facility] [phone].    ATENCIÓN: Si habla español, tiene a farias disposición servicios gratuitos de asistencia lingüística. Pierceame al [phone].    We comply with applicable federal civil rights laws and [place] laws. We do not discriminate on the basis of race, color, national origin, age, disability, sex, sexual orientation, or gender identity.            Thank you!     Thank you for choosing [facility]  for your care. Our goal is always to provide you with excellent care. Hearing back from our patients is one way we can continue to improve our services. Please take a few minutes to complete the written survey that you may receive in the mail after your visit with us. Thank you!             Your Updated Medication List - Protect others around you: Learn how to safely use, store and throw away your medicines at www.disposemymeds.org.          This list is accurate as of 4/13/18  5:03 PM.  Always use your most recent med list.                   Brand Name Dispense Instructions for use Diagnosis    mirabegron 25 MG 24 hr tablet    MYRBETRIQ    30 tablet    Take 1 tablet (25 mg) by mouth daily    Urinary frequency, Urinary urgency       MIRALAX PO           MYLANTA 200-200-20 MG/5ML Susp suspension   Generic drug:  alum & mag hydroxide-simethicone      Take 30 mLs by mouth every 4 hours as needed for indigestion        sotalol 80 MG tablet    BETAPACE     Take 80 mg by mouth 2 times daily        tiZANidine 4 MG tablet    ZANAFLEX    30 tablet    Take 1-2 tablets (4-8 mg) by mouth nightly as needed    Lumbar disc herniation       ZANTAC PO      Reported on 5/3/2017

## 2018-04-13 NOTE — NURSING NOTE
"Atilio Wiley's goals for this visit include:   Chief Complaint   Patient presents with     RECHECK     He requests these members of his care team be copied on today's visit information: YES    Initial BP 99/63 (BP Location: Left arm, Patient Position: Chair, Cuff Size: Adult Regular)  Pulse 54  Ht 1.702 m (5' 7\")  Wt 65.3 kg (143 lb 14.4 oz)  BMI 22.54 kg/m2 Estimated body mass index is 22.54 kg/(m^2) as calculated from the following:    Height as of this encounter: 1.702 m (5' 7\").    Weight as of this encounter: 65.3 kg (143 lb 14.4 oz).  BP completed using cuff size: regular        "

## 2018-04-14 NOTE — PROGRESS NOTES
UROLOGY OFFICE VISIT - FOLLOW UP    REASON FOR VISIT: follow up urodynamics    HPI: Mr. Atilio Wiley is a 37 year old male who returns to the urology clinic for follow up of urinary symptoms and to review results of recent urodynamic testing. To briefly summarize, he has been following with urology for long-standing obstructive voiding symptoms with intermittent urinary retention, ongoing for the past 10-15 years. Along with this, he also complains of urinary frequency, urgency, nocturia, and some perineal and rectal pain and issues with constipation and passing stool. [facility] with reportedly normal cystoscopy in 2016. Has also seen GI with recent normal colonoscopy as well as colorectal surgery in Nov 2017 who recommended defecography and referral to pelvic floor center. He has not followed up at the pelvic floor center.     With regard to his urinary symptoms, he has been treated for prostatitis with extended courses of antibiotics with no improvement. Has also tried Flomax with no improvement and this also caused some retrograde ejaculation which he did not like. He apparently has to self catheterize about once per year when he goes into spontaneous urinary retention. Last time he had to do this was in April 2017. He denies any dysuria, gross hematuria, pyuria, penile discharge, or concern for STD's. Recent Lumbar spine MRI on 3/8/18 revealed multilevel lumbar spondylosis with L4-5 right extraforaminal disc protrusion which indents and possibly impinges the exiting right L4 nerve root. He underwent epidural injection for this on 3/27/18. Reports no change in urinary symptoms since injection.    Called into clinic recently with complaints of worsening urinary symptoms including frequency, urgency, nocturia, hesitancy, needing to strain to void, and sense of incomplete emptying. He was therefore recommended to undergo urodynamics testing which he had previously deferred. This was  "performed on 4/11/18 at [facility] and he returns today to review results. Friend, Nydia, is along at today's appointment who asks many good questions as well.    PEx  BP 99/63 (BP Location: Left arm, Patient Position: Chair, Cuff Size: Adult Regular)  Pulse 54  Ht 1.702 m (5' 7\")  Wt 65.3 kg (143 lb 14.4 oz)  BMI 22.54 kg/m2  GEN: well-appearing male in NAD  RESP: no increased respiratory effort    PVR: 0 cc as measured by ultrasound    URODYNAMICS SUMMARY   4/11/8  -Small bladder capacity  -Reduced bladder compliance  -Heightened filling sensations  -No incontinence  -No DO/UDC's  -Normal detrusor pressures during voiding  -Slow flow (Qmax 5 ml/s) with incomplete bladder emptying ( mL)  -Pelvic floor tension and suspected voiding dysfunction based on increased EMG activity during voiding. No known cause for DSD in this otherwise neurologically intact patient.     IMAGING:   CT A/P w/contrast   5/19/16   (per records in Care Everywhere)  Impression:  Moderate amount of stool in the colon.  No other significant findings.      MR LUMBAR SPINE W/O CONTRAST 3/8/2018   Impression:   1. Multilevel lumbar spondylosis with L4-5 right extraforaminal disc  protrusion, which indents and possibly impinges the exiting right L4  nerve root.  2. Moderate right neural foraminal stenosis at L4-5 and mild left  neural foraminal stenosis at L3-4.  3. No significant spinal canal stenosis.      ASSESSMENT/PLAN  37 year old male with chronic constipation, pelvic pain, obstructive voiding symptoms with infrequent episodes of spontaneous urinary retention for which he self-catheterizes as needed (occurs maybe once per year), and worsening urinary frequency, urgency, and nocturia.     Recent UDS demonstrate a small capacity bladder due to low compliance, heightened filling sensations, no DO or incontinence, normal detrusor pressures during voiding but slow flow with incomplete bladder emptying and notable pelvic floor " tension during bladder filling and voiding. The exact etiology for his impaired bladder compliance is not clear, but could be related to lumbar spine DDD and herniated discs. Suspect his slow flow with incomplete emptying is secondary to pelvic floor/voiding dysfunction as he has no other evidence for bladder outlet obstruction (negative cystoscopy in recent 1-2 years) and has no known neurological reason to have DSD.     We discussed management options:  -For the impaired bladder compliance: trial of mirabegron (will avoid anticholinergics given chronic issues with constipation) or bladder Botox. He agrees to trial mirabegron. Rx for 25 mg once daily sent to pharmacy.  -For the pelvic floor tension/dysfunction and incomplete bladder emptying: recommend follow up with pelvic floor center and pelvic floor physical therapy. He already has this referral from colorectal team. Contact and scheduling information provided.     Follow up with me in 2 weeks for PVR and symptom check.     If no improvement with Myrbetriq or pelvic floor center, then would consider repeat cystoscopy. Despite negative cysto in 2016 with outside urologist, urethral stricture also remains on the differential.     45 minutes spent with the patient, >50% in counseling and coordination of care.    Shira Pack PA-C  Department of Urology

## 2018-04-18 ENCOUNTER — TELEPHONE (OUTPATIENT)
Dept: UROLOGY | Facility: CLINIC | Age: 38
End: 2018-04-18

## 2018-04-18 DIAGNOSIS — R10.2 PELVIC PAIN IN MALE: Primary | ICD-10-CM

## 2018-04-18 NOTE — TELEPHONE ENCOUNTER
"Called pt to discuss. Pt stated \"I'm having pain down there and I want all the bases covered.\" Prostate concern has not been addressed in recent OV note. Routing to Shira Pack PA-C for recommendation on how to proceed.    Mariam Monterroso LPN    "

## 2018-04-19 NOTE — TELEPHONE ENCOUNTER
Spoke with pt informing pelvic MRI ordered per Shira Pack PA-C. Pt stated understanding. Transferred to imaging to schedule.    Mariam Monterroso LPN

## 2018-04-19 NOTE — TELEPHONE ENCOUNTER
Patient requesting MRI for ongoing chronic pelvic and rectal pain. This is a reasonable request given his ongoing/worsening symptoms despite negative evaluation thus far with urine tests, CT scan in 2015, and cystoscopy in 2016. He does have known DDD and herniated discs in his lumbar spine for which he underwent epidural injection a few weeks ago with no change in symptoms thus far.    Order for pelvic MRI w/ w/o contrast placed. Will follow up on results.    Also encouraged patient to follow up with pelvic floor center as previously recommended by by urology and colorectal teams.    Shira Pack PA-C  Urology

## 2018-04-24 ENCOUNTER — PRE VISIT (OUTPATIENT)
Dept: RHEUMATOLOGY | Facility: CLINIC | Age: 38
End: 2018-04-24

## 2018-04-24 NOTE — TELEPHONE ENCOUNTER
PREVISIT INFORMATION                                                    Atilio Wiley scheduled for future visit at [facility] specialty clinics.    Patient is scheduled to see Dr Rodriguez on Wed 5/2/18  Reason for visit: Consult hand joint pain and stiffness   Referring provider Denise Thomson  Has patient seen previous specialist? No  Medical Records:  Available in chart.  Patient was previously seen at a [place] or [facility] facility.    REVIEW                                                      New patient packet mailed to patient: Yes  Medication reconciliation complete: Yes      Current Outpatient Prescriptions   Medication Sig Dispense Refill     alum & mag hydroxide-simethicone (MYLANTA) 200-200-20 MG/5ML SUSP suspension Take 30 mLs by mouth every 4 hours as needed for indigestion       mirabegron (MYRBETRIQ) 25 MG 24 hr tablet Take 1 tablet (25 mg) by mouth daily 30 tablet 5     Polyethylene Glycol 3350 (MIRALAX PO)        sotalol (BETAPACE) 80 MG tablet Take 80 mg by mouth 2 times daily       tiZANidine (ZANAFLEX) 4 MG tablet Take 1-2 tablets (4-8 mg) by mouth nightly as needed 30 tablet 1     ZANTAC OR Reported on 5/3/2017         Allergies: Review of patient's allergies indicates no known allergies.      Patient review:  Who is currently managing your care (update PCP if needed)? Denise Thomson    Are you currently taking any medications to manage your rheumatology condition? No   If not, have you previously taken any rheumatology medications like DMARD or biologic (type, dates, length of tx, effective or not)? No      Are you taking any medications over-the-counter? Yes     Update home medications and allergies info: Yes     Have you had any recent rheumatology labs? Yes   Last lab results in chart:    Hemoglobin   Date Value Ref Range Status   02/13/2018 14.9 13.3 - 17.7 g/dL Final     Urea Nitrogen   Date Value Ref Range Status   02/13/2018 21 7 - 30 mg/dL Final      Sed Rate   Date Value Ref Range Status   02/13/2018 7 0 - 15 mm/h Final     CRP Inflammation   Date Value Ref Range Status   02/13/2018 <2.9 0.0 - 8.0 mg/L Final     AST   Date Value Ref Range Status   02/13/2018 32 0 - 45 U/L Final     Albumin   Date Value Ref Range Status   02/13/2018 4.6 3.4 - 5.0 g/dL Final     Alkaline Phosphatase   Date Value Ref Range Status   02/13/2018 47 40 - 150 U/L Final     ALT   Date Value Ref Range Status   02/13/2018 41 0 - 70 U/L Final     Rheumatoid Factor   Date Value Ref Range Status   02/13/2018 <20 <20 IU/mL Final     Recent Labs   Lab Test  02/13/18   1711   WBC  5.6   HGB  14.9   HCT  45.9   MCV  91   PLT  193   BUN  21   TSH  2.32   AST  32   ALT  41   ALKPHOS  47       Color Urine (no units)   Date Value   04/19/2017 Light Yellow     Appearance Urine (no units)   Date Value   04/19/2017 Clear     Glucose Urine (mg/dL)   Date Value   04/19/2017 Negative     Bilirubin Urine (no units)   Date Value   04/19/2017 Negative     Ketones Urine (mg/dL)   Date Value   04/19/2017 Negative     Specific Gravity Urine (no units)   Date Value   04/19/2017 1.005     pH Urine (pH)   Date Value   04/19/2017 6.5     Protein Albumin Urine (mg/dL)   Date Value   04/19/2017 Negative     Nitrite Urine (no units)   Date Value   04/19/2017 Negative     Leukocyte Esterase Urine (no units)   Date Value   04/19/2017 Negative       Have you had any recent x-rays related to your visit? Yes MRI Spine and xrays    Are your immunizations up-to-date? Yes    Do you have copy of your immunizations? N/A     There is no immunization history on file for this patient.        PLAN/FOLLOW-UP NEEDED                                                      Previsit review complete.  Patient will see provider at future scheduled appointment.     Patient Reminders Given:  Please, make sure you bring an updated list of your medications.   Please, present 15 minutes early.  If you need to cancel or reschedule,please call  [phone].  Pam Early LPN  [facility]  Rheumatology

## 2018-04-27 ENCOUNTER — HOSPITAL ENCOUNTER (OUTPATIENT)
Dept: MRI IMAGING | Facility: CLINIC | Age: 38
Discharge: HOME | End: 2018-04-27
Attending: PHYSICIAN ASSISTANT | Admitting: PHYSICIAN ASSISTANT
Payer: COMMERCIAL

## 2018-04-27 ENCOUNTER — TELEPHONE (OUTPATIENT)
Dept: UROLOGY | Facility: CLINIC | Age: 38
End: 2018-04-27

## 2018-04-27 DIAGNOSIS — R10.2 PELVIC PAIN IN MALE: ICD-10-CM

## 2018-04-27 PROCEDURE — 25000128 H RX IP 250 OP 636: Performed by: PHYSICIAN ASSISTANT

## 2018-04-27 PROCEDURE — 72197 MRI PELVIS W/O & W/DYE: CPT

## 2018-04-27 PROCEDURE — A9585 GADOBUTROL INJECTION: HCPCS | Performed by: PHYSICIAN ASSISTANT

## 2018-04-27 RX ORDER — GADOBUTROL 604.72 MG/ML
7.5 INJECTION INTRAVENOUS ONCE
Status: COMPLETED | OUTPATIENT
Start: 2018-04-27 | End: 2018-04-27

## 2018-04-27 RX ADMIN — GADOBUTROL 7 ML: 604.72 INJECTION INTRAVENOUS at 17:35

## 2018-04-27 NOTE — TELEPHONE ENCOUNTER
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 He would need to establish with a primary care doctor to have this completed. The patient was transferred to the scheduling line to make a primary care appointment.     Ashlyn Ceballos CMA

## 2018-04-27 NOTE — LETTER
Patient:  Atilio Wiley  :   1980  MRN:     [id number]        Mr.Jacob KATRINA Wiley  [address] [facility] [address]        2018    Dear ,    We are writing to inform you of your test results as discussed over the phone.    Your MRI results show the followin. Inflammation in the prostate. This could be related to prostatitis (infection). Recommend treating with Bactrim DS  twice daily x 21 days. This medication was sent to the [facility] in [facility] in [place]. You can also take OTC ibuprofen or Naproxen which can help with pain/inflammation.   2. Possible thrombosed hemorrhoid vs anal fissure. Please follow up with colorectal for this.   3. Disc bulge at L5-S1. Please follow up with ortho/spine for this.   Please follow up with Shira Pack PA-C in 2-4 weeks to recheck.    Thank you, Shira Pack PA-C    Resulted Orders   MRI Pelvis w & w/o contrast    Narrative    EXAMINATION: MR PELVIS W/O & W CONTRAST, 2018 5:35 PM     COMPARISON: No similar study. CT exam 2016.    TECHNIQUE:  Images were acquired without and with intravenous contrast  through the pelvis. The following MR images were acquired without  contrast: multiplanar T1, multiplanar T2, axial diffusion-weighted and  axial apparent diffusion coefficient. T1-weighted images with fat  saturation were acquired at the following intervals relative to  intravenous contrast administration: pre-contrast, immediate post  contrast, 1 minute, 3 minutes and delayed.  Contrast dose: 7cc  Gadavist Injected    HISTORY: Chronic pelvic and rectal pain. Previous negative CT, urine  testing, cystoscopy.    FINDINGS:    Visualized loops of bowel are within normal limits including the  sigmoid and rectum. No evidence for fistulous tracts, abscesses,  strictures or fluid collections. There is a small amount of  inflammatory change at the posterior aspect of the anus, with some  restricted diffusion, enhancement, T2 hyperintensity.    Small sub-4  mm pelvic lymph nodes without suspicious features. No  inguinal lymphadenopathy. Symmetric seminal vesicles. Partially  visualized urinary bladder, unremarkable. No free fluid in the pelvis.  Diffuse decreased T2 signal throughout the peripheral zone of the  prostate, likely due to inflammation. Degenerative changes of the  lower lumbar spine. Mild circumferential disc bulging at L5-S1  impressing the anterior aspect of the spinal canal with an associated  annular tear, series 4 image 17.      Impression    Impression:  1. Prostate findings most likely due to inflammation, as described  above.  2. Small focus of inflammatory change the posterior aspect of the anal  verge, possibly thrombosed hemorrhoid versus anal fissure. Recommend  correlation with physical exam.  3. Mild circumferential disc bulging at L5-S1 impressing the anterior  aspect of the spinal canal with an associated annular tear.    I have personally reviewed the examination and initial interpretation  and I agree with the findings.    CHRISTOPHER WASHINGTON     Please call the [facility] at [phone] with any questions.

## 2018-04-27 NOTE — TELEPHONE ENCOUNTER
[facility] Call Center    Phone Message    May a detailed message be left on voicemail: yes    Reason for Call: Other: Patient is calling requesting to speak with clinic about getting a DOT medical card. Please advise.      Action Taken: Message routed to:  Adult Clinics: Urology p [id number]

## 2018-04-30 ENCOUNTER — TELEPHONE (OUTPATIENT)
Dept: UROLOGY | Facility: CLINIC | Age: 38
End: 2018-04-30

## 2018-04-30 DIAGNOSIS — R10.2 PELVIC PAIN IN MALE: Primary | ICD-10-CM

## 2018-04-30 RX ORDER — LEVOFLOXACIN 500 MG/1
500 TABLET, FILM COATED ORAL DAILY
Qty: 21 TABLET | Refills: 0 | Status: CANCELLED | OUTPATIENT
Start: 2018-04-30 | End: 2018-05-21

## 2018-04-30 RX ORDER — SULFAMETHOXAZOLE/TRIMETHOPRIM 800-160 MG
1 TABLET ORAL 2 TIMES DAILY
Qty: 42 TABLET | Refills: 0 | Status: SHIPPED | OUTPATIENT
Start: 2018-04-30 | End: 2018-05-11

## 2018-04-30 NOTE — TELEPHONE ENCOUNTER
Received message from Shira Pack PA-C with recommendation to switch to Bactrim DS po BID for 21 days. Bactrim DS ordered per Shira Pack PA-C. Prescription sent to [facility] in [facility] in [place] per patient request. Result letter written and mailed to patient's home address on file per patient request.    Called and spoke to patient who is aware that Bactrim DS was sent to his pharmacy. Patient will call the clinic with any questions or concerns.    Kaylah Roman RN, BSN

## 2018-04-30 NOTE — PROGRESS NOTES
Please call patient with MRI results which show the followin. Inflammation in the prostate. This could be related to prostatitis (infection). Recommend treating with Levaquin 500 mg once daily x 21 days. Please confirm pharmacy and send Rx. He can also take OTC ibuprofen or Naproxen which can help with pain/inflammation.  2. Possible thrombosed hemorrhoid vs anal fissure. Should follow up with colorectal for this (he is an established patient with them).  3. Disc bulge at L5-S1. He should follow up with ortho/spine for this (he is an established patient with them).  He can follow up with me in 2-4 weeks to recheck.  Thanks! Shira Pack PA-C

## 2018-04-30 NOTE — TELEPHONE ENCOUNTER
Called and spoke to patient who is aware of the 4/27/18 Pelvic MRI results, result note and recommendations. Patient allergies verified. Attempted to order levaquin 500mg daily for 21 days per Shira Pack PA-C, but received an alert stating that it interferes with patient's sotalol medication. Informed patient that a message would be sent to Shira Pack PA-C with request to review and give recommendations regarding the antibiotic. Patient would like a prescription sent to [facility] in [facility] in [place]. Patient aware that he will be contacted if additional questions arise.     Patient requested for result letter to be sent to him. Will mail letter once antibiotic information is known.    Kaylah Roman RN, BSN

## 2018-05-02 ENCOUNTER — OFFICE VISIT (OUTPATIENT)
Dept: RHEUMATOLOGY | Facility: CLINIC | Age: 38
End: 2018-05-02
Attending: NURSE PRACTITIONER
Payer: COMMERCIAL

## 2018-05-02 VITALS
OXYGEN SATURATION: 100 % | BODY MASS INDEX: 22.18 KG/M2 | SYSTOLIC BLOOD PRESSURE: 100 MMHG | TEMPERATURE: 98.2 F | RESPIRATION RATE: 18 BRPM | DIASTOLIC BLOOD PRESSURE: 66 MMHG | WEIGHT: 141.3 LBS | HEIGHT: 67 IN | HEART RATE: 68 BPM

## 2018-05-02 DIAGNOSIS — R76.8 POSITIVE ANA (ANTINUCLEAR ANTIBODY): Primary | ICD-10-CM

## 2018-05-02 PROCEDURE — 99204 OFFICE O/P NEW MOD 45 MIN: CPT | Performed by: STUDENT IN AN ORGANIZED HEALTH CARE EDUCATION/TRAINING PROGRAM

## 2018-05-02 ASSESSMENT — PAIN SCALES - GENERAL: PAINLEVEL: EXTREME PAIN (8)

## 2018-05-02 NOTE — NURSING NOTE
"Atilio Wiley's goals for this visit include:   He requests these members of his care team be copied on today's visit information:     PCP: Chana Poon    Referring Provider:  Denise Ray, APRN CNP  [address]  [place] [address]    Chief Complaint   Patient presents with     Consult     Elev PAMELA joint pains       Initial /66 (Patient Position: Sitting, Cuff Size: Adult Large)  Pulse 68  Temp 98.2  F (36.8  C)  Resp 18  Ht 1.702 m (5' 7\")  Wt 64.1 kg (141 lb 4.8 oz)  SpO2 100%  BMI 22.13 kg/m2 Estimated body mass index is 22.13 kg/(m^2) as calculated from the following:    Height as of this encounter: 1.702 m (5' 7\").    Weight as of this encounter: 64.1 kg (141 lb 4.8 oz).  Medication Reconciliation: complete    Do you need any medication refills at today's visit? No  Chief Complaint   Patient presents with     Consult     Elev PAMELA joint pains         "

## 2018-05-02 NOTE — PATIENT INSTRUCTIONS
The following is a summary of your office visit:      Patient instructions:    Return to clinic as needed.      If you have had any blood work, imaging or other testing completed we will be in touch within 1-2 weeks regarding the results.     If you need refills please contact your pharmacy.     Urgent after hour care please call the [facility] at [phone].    It was a pleasure meeting with you today. Please let us know if there is anything else we can do for you so that we can be sure you are leaving completely satisfied with your care experience.     If you have any questions, concerns or need to schedule a follow up, please contact us at [phone] and our fax [phone].

## 2018-05-02 NOTE — PROGRESS NOTES
Rheumatology Clinic Visit     Atilio Wiley MRN# [id number]   YOB: 1980 Age: 37 year old     Date of Visit: May 2, 2018  Primary care provider: Chana Poon          Assessment and Plan:   Assessment     -- Chronic abdominal and pelvic pain  -- Obstructive voiding symptoms   -- Erectile dysfunction  -- GERD  -- Hand pain  -- Lumbar degenerative disc disease.   -- Positive PAMELA-1: 160 homogenous      Mr. Brown is 37-year-old male seen in clinic for evaluation of chronic abdominal pain and hand pain in the setting of positive PAMELA.     Chronic abdominal pain: His main concern is chronic pain in the left lumbar quadrant for the last 10 years.  He has seen gastroenterologist and had colonoscopy and upper GI endoscopy along with CT abdomen and recent MRI pelvis which were unremarkable except for evidence of prostatitis. He started taking Bactrim for it.  Exact cause of chronic abdominal pain is unknown.  He also has obstructive voiding symptoms and complains of urinary urgency and increased frequency.  He has seen urologist and was recommended to follow-up with pelvic floor center which he has not done yet. Recent UDS demonstrate a small capacity bladder due to low compliance, heightened filling sensations, slow flow with incomplete bladder emptying and notable pelvic floor tension during bladder filling and voiding.     Positive PAMELA: He has positive PAMELA of 1: 160 homogenous.  Complains of mild pain in his hands but does not have any evidence of synovitis, tenderness on exam.  His rheumatoid serologies tested were normal.  He denies having raynaud's, fatigue, flulike symptoms, pleurisy, oral sores, sicca symptoms or history of blood clots.  His presentation is not suggestive of autoimmune connective tissue disease.  Since he complained of hand pain and stiffness I asked him to get basic autoimmune serologies for lupus and other connective tissue disease but he refused to get it done.  In case he develops  above-mentioned symptoms then will reevaluate him. At this point autoimmune connective tissue disease seems unlikely.    His presentation is not suggestive of small vessel or medium vessel vasculitis.  He denies having Skin rashes, hemoptysis, hematuria, focal neurologic deficits.     Plan     -- Patient refused to get blood tests     -- RTC on as needed basis.           Active Problem List:     Patient Active Problem List    Diagnosis Date Noted     Gastroesophageal reflux disease without esophagitis 05/10/2017     Priority: Medium     Elevated prolactin level (H) 02/11/2016     Priority: Medium     Constipation 02/11/2016     Priority: Medium     Erectile dysfunction 02/11/2016     Priority: Medium     CARDIOVASCULAR SCREENING; LDL GOAL LESS THAN 160 10/31/2010     Priority: Medium     Paroxysmal a-fib (H) 02/19/2009     Priority: Medium            History of Present Illness:   Atilio Wiley is a 37 year old male with PMH of erectile dysfunction, constipation, GERD, paroxysmal A. fib seen in the clinic in consultation at request of Denise Ray for evaluation of abdominal pain and hand pain in setting of positive PAMELA.    He complains of chronic dull pain in the left lumbar quadrant for the last 10 years.  He has seen gastroenterologist and had upper and lower GI endoscopy was normal.  He had a CT abdomen in 2016 which was normal and had a recent MRI pelvis a week ago which showed evidence of prostatitis.      He also follows up with urologist for obstructive voiding symptoms. He also complains of urinary frequency, urgency, nocturia, and some perineal and rectal pain and issues with constipation and passing stool. Had normal cystoscopy in 2016. Has also seen GI with recent normal colonoscopy as well as colorectal surgery in Nov 2017 who recommended defecography and referral to pelvic floor center.     He denies significant pain in his hands or other joints but notice mild stiffness and ache in his hands after  driving for a long time.  He denies any stiffness in the morning, fatigue, night sweats or fevers.  There is no family history of rheumatoid arthritis or other autoimmune diseases. Denies any raynauds, malar rash, photosensitivity, recurrent mouth/genital ulcers, sicca symptoms, pleuritic chest pains, recurrent sinusitis/rhinitis, swallowing difficulty, hearing or visual changes recently. No h/o arterial/venous thrombosis in the past.     He also complains of chronic pain on his nose specifically located on his left nasal bone. When he pushes on it very tender and uncomfortable. He was seen by ENT specialist Dr. Lorenzana who found no significant structural abnormalities, masses or lesions. The tissues overall looked very healthy.            Review of Systems:   Review Of Systems  Constitutional: denies fever, chills, night sweats and weight loss.  Skin: No skin rash.  Eyes: No dryness or irritation in eyes. No episode of eye inflammation or redness.   Ears/Nose/Throat: no recurrent sinus infections.  Respiratory: No shortness of breath, dyspnea on exertion, cough, or hemoptysis  Cardiovascular: no chest pain or palpitations.  Gastrointestinal: no nausea, vomiting, abdominal pain.  Normal bowel movements.  Genitourinary: no dysuria, frequency  or hematuria.  Musculoskeletal: as in HPI  Neurologic: no numbness, tingling.  Psychiatric: no mood disorders.  Hematologic/Lymphatic/Immunologic: no history of easy bruising, petechia or purpura.  No abnormal bleeding.   Endocrine: no h/o thyroid disease or Diabetes.                  Past Medical History:     Past Medical History:   Diagnosis Date     A-fib (H)      Deviated nasal septum     left sided maxillary facial pain     Past Surgical History:   Procedure Laterality Date     COMBINED ESOPHAGOSCOPY, GASTROSCOPY, DUODENOSCOPY (EGD) WITH CO2 INSUFFLATION N/A 3/20/2018    Procedure: COMBINED ESOPHAGOSCOPY, GASTROSCOPY, DUODENOSCOPY (EGD) WITH CO2 INSUFFLATION;  edg;  Surgeon:  "Duane, William Charles, MD;  Location: [facility] OR     ESOPHAGOSCOPY, GASTROSCOPY, DUODENOSCOPY (EGD), COMBINED N/A 3/20/2018    Procedure: COMBINED ESOPHAGOSCOPY, GASTROSCOPY, DUODENOSCOPY (EGD), BIOPSY SINGLE OR MULTIPLE;;  Surgeon: Duane, William Charles, MD;  Location: [facility] OR     SINUS SURGERY              Social History:     Social History     Occupational History      [employer]     Social History Main Topics     Smoking status: Never Smoker     Smokeless tobacco: Never Used     Alcohol use No     Drug use: No     Sexual activity: Not Currently            Family History:   No family history on file.         Allergies:   No Known Allergies         Medications:     Current Outpatient Prescriptions   Medication Sig Dispense Refill     alum & mag hydroxide-simethicone (MYLANTA) 200-200-20 MG/5ML SUSP suspension Take 30 mLs by mouth every 4 hours as needed for indigestion       mirabegron (MYRBETRIQ) 25 MG 24 hr tablet Take 1 tablet (25 mg) by mouth daily 30 tablet 5     Polyethylene Glycol 3350 (MIRALAX PO)        sotalol (BETAPACE) 80 MG tablet Take 80 mg by mouth daily        sulfamethoxazole-trimethoprim (BACTRIM DS/SEPTRA DS) 800-160 MG per tablet Take 1 tablet by mouth 2 times daily for 21 days 42 tablet 0     ZANTAC OR Reported on 5/3/2017       tiZANidine (ZANAFLEX) 4 MG tablet Take 1-2 tablets (4-8 mg) by mouth nightly as needed (Patient not taking: Reported on 4/24/2018) 30 tablet 1            Physical Exam:   Blood pressure 100/66, pulse 68, temperature 98.2  F (36.8  C), resp. rate 18, height 1.702 m (5' 7\"), weight 64.1 kg (141 lb 4.8 oz), SpO2 100 %.  Wt Readings from Last 4 Encounters:   05/02/18 64.1 kg (141 lb 4.8 oz)   04/13/18 65.3 kg (143 lb 14.4 oz)   03/13/18 65.8 kg (145 lb)   02/26/18 65.8 kg (145 lb)       Constitutional: well-developed, appearing stated age; cooperative  Eyes: nl EOM, PERRLA, vision, conjunctiva, sclera  ENT: nl external ears, nose, hearing, lips, teeth, gums, " throat  No mucous membrane lesions, normal saliva pool  Neck: no mass or thyroid enlargement  Resp: lungs clear to auscultation, nl to palpation  CV: RRR, no murmurs, rubs or gallops, no edema  GI: no ABD mass or tenderness, no HSM  : not tested  Lymph: no cervical, supraclavicular, inguinal or epitrochlear nodes    MS: All TMJ, neck, shoulder, elbow, wrist, MCP/PIP/DIP, spine, hip, knee, ankle, and foot MTP/IP joints were examined.   -- No active synovitis or deformity. Full ROM.  Normal  strength.   -- No dactylitis,  tenosynovitis, enthesopathy.    Skin: no nail pitting, alopecia, rash, nodules or lesions  Neuro: nl cranial nerves, strength, sensation, DTRs.   Psych: nl judgement, orientation, memory, affect.         Data:     Results for orders placed or performed during the hospital encounter of 04/27/18   MRI Pelvis w & w/o contrast    Narrative    EXAMINATION: MR PELVIS W/O & W CONTRAST, 4/27/2018 5:35 PM     COMPARISON: No similar study. CT exam 5/19/2016.    TECHNIQUE:  Images were acquired without and with intravenous contrast  through the pelvis. The following MR images were acquired without  contrast: multiplanar T1, multiplanar T2, axial diffusion-weighted and  axial apparent diffusion coefficient. T1-weighted images with fat  saturation were acquired at the following intervals relative to  intravenous contrast administration: pre-contrast, immediate post  contrast, 1 minute, 3 minutes and delayed.  Contrast dose: 7cc  Gadavist Injected    HISTORY: Chronic pelvic and rectal pain. Previous negative CT, urine  testing, cystoscopy.    FINDINGS:    Visualized loops of bowel are within normal limits including the  sigmoid and rectum. No evidence for fistulous tracts, abscesses,  strictures or fluid collections. There is a small amount of  inflammatory change at the posterior aspect of the anus, with some  restricted diffusion, enhancement, T2 hyperintensity.    Small sub-4 mm pelvic lymph nodes without  suspicious features. No  inguinal lymphadenopathy. Symmetric seminal vesicles. Partially  visualized urinary bladder, unremarkable. No free fluid in the pelvis.  Diffuse decreased T2 signal throughout the peripheral zone of the  prostate, likely due to inflammation. Degenerative changes of the  lower lumbar spine. Mild circumferential disc bulging at L5-S1  impressing the anterior aspect of the spinal canal with an associated  annular tear, series 4 image 17.      Impression    Impression:  1. Prostate findings most likely due to inflammation, as described  above.  2. Small focus of inflammatory change the posterior aspect of the anal  verge, possibly thrombosed hemorrhoid versus anal fissure. Recommend  correlation with physical exam.  3. Mild circumferential disc bulging at L5-S1 impressing the anterior  aspect of the spinal canal with an associated annular tear.    I have personally reviewed the examination and initial interpretation  and I agree with the findings.    CHRISTOPHER WASHINGTON       Recent Labs   Lab Test  02/13/18   1711   WBC  5.6   RBC  5.06   HGB  14.9   HCT  45.9   MCV  91   RDW  12.7   PLT  193   ALBUMIN  4.6   CRP  <2.9   BUN  21      Recent Labs   Lab Test  02/13/18   1711   TSH  2.32   T4  0.97     Hemoglobin   Date Value Ref Range Status   02/13/2018 14.9 13.3 - 17.7 g/dL Final     Urea Nitrogen   Date Value Ref Range Status   02/13/2018 21 7 - 30 mg/dL Final     Sed Rate   Date Value Ref Range Status   02/13/2018 7 0 - 15 mm/h Final     CRP Inflammation   Date Value Ref Range Status   02/13/2018 <2.9 0.0 - 8.0 mg/L Final     AST   Date Value Ref Range Status   02/13/2018 32 0 - 45 U/L Final     Albumin   Date Value Ref Range Status   02/13/2018 4.6 3.4 - 5.0 g/dL Final     Alkaline Phosphatase   Date Value Ref Range Status   02/13/2018 47 40 - 150 U/L Final     ALT   Date Value Ref Range Status   02/13/2018 41 0 - 70 U/L Final     Rheumatoid Factor   Date Value Ref Range Status   02/13/2018 <20  <20 IU/mL Final     Recent Labs   Lab Test  02/13/18   1711   WBC  5.6   HGB  14.9   HCT  45.9   MCV  91   PLT  193   BUN  21   TSH  2.32   AST  32   ALT  41   ALKPHOS  47       Reviewed Rheumatology lab flowsheet    Dina Rodriguez MD  [facility]  Department of Rheumatology & Autoimmune Disorders  [facility]: [phone]   Pager - [phone]

## 2018-05-02 NOTE — MR AVS SNAPSHOT
After Visit Summary   5/2/2018    Atilio Wiley    MRN: [id number]           Patient Information     Date Of Birth          1980        Visit Information        Provider Department      5/2/2018 3:00 PM Dina Rodriguez MD [facility]        Care Instructions      The following is a summary of your office visit:      Patient instructions:    Return to clinic as needed.      If you have had any blood work, imaging or other testing completed we will be in touch within 1-2 weeks regarding the results.     If you need refills please contact your pharmacy.     Urgent after hour care please call the [facility] at [phone].    It was a pleasure meeting with you today. Please let us know if there is anything else we can do for you so that we can be sure you are leaving completely satisfied with your care experience.     If you have any questions, concerns or need to schedule a follow up, please contact us at [phone] and our fax [phone].    Your Rheumatology Team at [facility]  RN Care Coordinator: Miya HUMMEL: Pam TIAN: Yanira            Follow-ups after your visit        Who to contact     If you have questions or need follow up information about today's clinic visit or your schedule please contact [facility] directly at [phone].  Normal or non-critical lab and imaging results will be communicated to you by MyChart, letter or phone within 4 business days after the clinic has received the results. If you do not hear from us within 7 days, please contact the clinic through MyChart or phone. If you have a critical or abnormal lab result, we will notify you by phone as soon as possible.  Submit refill requests through ThromboVision or call your pharmacy and they will forward the refill request to us. Please allow 3 business days for your refill to be completed.          Additional Information About Your Visit        Xookerhart  "Information     Customer BOOM (formerly Renter's BOOM) is an electronic gateway that provides easy, online access to your medical records. With Customer BOOM (formerly [employer] BOOM), you can request a clinic appointment, read your test results, renew a prescription or communicate with your care team.     To sign up for Customer BOOM (formerly [employer] BO[employer]) visit the website at [email or web address]   You will be asked to enter the access code listed below, as well as some personal information. Please follow the directions to create your username and password.     Your access code is: [email or web address]  Expires: 2018  5:46 PM     Your access code will  in 90 days. If you need help or a new code, please contact your [facility] or call [phone] for assistance.        Care EveryWhere ID     This is your Care EveryWhere ID. This could be used by other organizations to access your [facility] medical records  [id number]        Your Vitals Were     Pulse Temperature Respirations Height Pulse Oximetry BMI (Body Mass Index)    68 98.2  F (36.8  C) 18 5' 7\" (1.702 m) 100% 22.13 kg/m2       Blood Pressure from Last 3 Encounters:   18 100/66   18 99/63   18 109/63    Weight from Last 3 Encounters:   18 141 lb 4.8 oz (64.1 kg)   18 143 lb 14.4 oz (65.3 kg)   18 145 lb (65.8 kg)              Today, you had the following     No orders found for display       Primary Care Provider Office Phone # Fax #    DAVID Monahan [facility] [phone]       [address] [facility] [address]        Equal Access to Services     DIMITRY RAMÍREZ AH: Hadii kayla angelo Soarianna, waaxda luqadaha, qaybta kaalmada adedevonteyada, delisa dale . So [facility] [phone].    ATENCIÓN: Si habla español, tiene a farias disposición servicios gratuitos de asistencia lingüística. Llame al [phone].    We comply with applicable federal civil rights laws and [place] laws. We do not discriminate on the basis of race, color, national " origin, age, disability, sex, sexual orientation, or gender identity.            Thank you!     Thank you for choosing [facility]  for your care. Our goal is always to provide you with excellent care. Hearing back from our patients is one way we can continue to improve our services. Please take a few minutes to complete the written survey that you may receive in the mail after your visit with us. Thank you!             Your Updated Medication List - Protect others around you: Learn how to safely use, store and throw away your medicines at www.disposemymeds.org.          This list is accurate as of 5/2/18  4:02 PM.  Always use your most recent med list.                   Brand Name Dispense Instructions for use Diagnosis    mirabegron 25 MG 24 hr tablet    MYRBETRIQ    30 tablet    Take 1 tablet (25 mg) by mouth daily    Urinary frequency, Urinary urgency       MIRALAX PO           MYLANTA 200-200-20 MG/5ML Susp suspension   Generic drug:  alum & mag hydroxide-simethicone      Take 30 mLs by mouth every 4 hours as needed for indigestion        sotalol 80 MG tablet    BETAPACE     Take 80 mg by mouth daily        sulfamethoxazole-trimethoprim 800-160 MG per tablet    BACTRIM DS/SEPTRA DS    42 tablet    Take 1 tablet by mouth 2 times daily for 21 days    Pelvic pain in male       tiZANidine 4 MG tablet    ZANAFLEX    30 tablet    Take 1-2 tablets (4-8 mg) by mouth nightly as needed    Lumbar disc herniation       ZANTAC PO      Reported on 5/3/2017

## 2018-05-03 ENCOUNTER — TELEPHONE (OUTPATIENT)
Dept: UROLOGY | Facility: CLINIC | Age: 38
End: 2018-05-03

## 2018-05-03 DIAGNOSIS — N41.9 PROSTATITIS: Primary | ICD-10-CM

## 2018-05-03 DIAGNOSIS — R39.15 URINARY URGENCY: ICD-10-CM

## 2018-05-03 DIAGNOSIS — R35.0 URINARY FREQUENCY: ICD-10-CM

## 2018-05-03 NOTE — TELEPHONE ENCOUNTER
Nurse returned call to patient, he states that the Bactrim that Shira Pack prescribed him for prostatitis has caused him to develop a rash. He reports having a rash on neck, thighs and ankles, the rash is causing itching. He also reports having red eyes.  Nurse advised that he stop the Bactrim and take some benadryl or Zyrtec for the allergy symptoms. Nurse explained that a message would be sent to Dr. Reno since Shira is out of the office for recommendations.   Advised that in the meantime if his symptoms get worse to go to the ER.    Carie Sanches RN, BSN, PHN  [facility]  GI/Gen Surg/Hepatology Care Coordinator

## 2018-05-03 NOTE — TELEPHONE ENCOUNTER
[facility] Call Center    Phone Message    May a detailed message be left on voicemail: yes    Reason for Call: Patient called requesting a call back right away. Only thing patient stated is that it is in relation to medication and then disconnected with me.    Action Taken: Message routed to:  Adult Clinics: Urology p [phone]

## 2018-05-08 NOTE — TELEPHONE ENCOUNTER
Andre Reno MD Frischmon, Amanda R, RN        Caller: Unspecified (5 days ago,  4:05 PM)                       Stop the medication and follow-up with Shira Pack at his convenience for further discussion.   Agree with benadryl over-the-counter.     SARAH BETH       Nurse called and updated patient on message from Dr. Reno. He states that the rashes are not gone but are better. Nurse told him that the urology nurse and Shira Pack will follow up with him on Friday with new treatment recommendations, he verbalized understanding.     Carie Sanches RN, BSN, PHN  [facility]  GI/Gen Surg/Hepatology Care Coordinator

## 2018-05-11 RX ORDER — SOLIFENACIN SUCCINATE 5 MG/1
5 TABLET, FILM COATED ORAL DAILY
Qty: 30 TABLET | Refills: 5 | Status: SHIPPED | OUTPATIENT
Start: 2018-05-11

## 2018-05-11 NOTE — TELEPHONE ENCOUNTER
Discussed patient's question regarding myrbetriq with Shira Pack PA-C. Per Shira Pack PA-C, the patient could try vesicare 5mg daily in place of the myrbetriq to see if it will be more cost effective. Per Shira Pack PA-C, if patient has no improvement with vesicare and augmentin, then patient should follow up in clinic.    Called and spoke to patient who is aware of the above recommendations. Patient would like to try the vesicare medication. vesicare ordered per Shira Pack PA-C. Prescription sent to [facility] in [facility] in [place] per patient request. Patient aware to take the vesicare in place of the myrbetriq and will call with any questions.    Kaylah Roman RN, BSN

## 2018-05-11 NOTE — TELEPHONE ENCOUNTER
"Discussed note below with Shira Pack PA-C who recommends treatment with augmentin 875 twice a day for 21 days.     Called and spoke to patient who is aware of the above information. Patient verbalized understanding and was comfortable with plan. augmentin ordered per Shira Pack PA-C and prescription sent to [facility] in [facility] in [place] per patient request. Patient stated, \"I forgot to ask. I am taking that bladder medication and wondering if there is an alternative because it is so costly.\" Patient was prescribed myrbetriq on 4/13/18. Informed patient that a message would be sent to Shira Pack PA-C and that he will be contacted with recommendations.    Kaylah Roman RN, BSN        "

## 2018-05-11 NOTE — TELEPHONE ENCOUNTER
"Called and spoke to patient who is aware of the recommendations below from Shira Pack PA-C. Patient reports that he has stopped the bactrim and that bactrim will be added to his allergy list. Patient stated, \"I was on doxycycline twice before and it doesn't work. I need something different.\" Informed patient that this will be discussed with Shira Pack PA-C and that he will be contacted with additional recommendations. Patient verbalized understanding and was comfortable with plan.    Kaylah Roman RN, BSN    "

## 2018-05-11 NOTE — TELEPHONE ENCOUNTER
Stop Bactrim and add medication to allergy list.  If allergy symptoms do not completely resolve, would advise following up with primary care or urgent care.    Can switch to Doxycycline 100 mg BID x 21 days for prostatitis treatment.    Shira Pack PA-C  Urology

## 2018-09-18 ENCOUNTER — OFFICE VISIT (OUTPATIENT)
Dept: PODIATRY | Facility: CLINIC | Age: 38
End: 2018-09-18
Payer: COMMERCIAL

## 2018-09-18 VITALS — HEART RATE: 55 BPM | OXYGEN SATURATION: 100 % | SYSTOLIC BLOOD PRESSURE: 110 MMHG | DIASTOLIC BLOOD PRESSURE: 66 MMHG

## 2018-09-18 DIAGNOSIS — L60.0 INGROWING NAIL: Primary | ICD-10-CM

## 2018-09-18 PROCEDURE — 99202 OFFICE O/P NEW SF 15 MIN: CPT | Performed by: PODIATRIST

## 2018-09-18 ASSESSMENT — PAIN SCALES - GENERAL: PAINLEVEL: SEVERE PAIN (7)

## 2018-09-18 NOTE — PROGRESS NOTES
Past Medical History:   Diagnosis Date     A-fib (H)      Deviated nasal septum     left sided maxillary facial pain     Positive PAMELA (antinuclear antibody)      Patient Active Problem List   Diagnosis     Paroxysmal a-fib (H)     CARDIOVASCULAR SCREENING; LDL GOAL LESS THAN 160     Elevated prolactin level (H)     Constipation     Erectile dysfunction     Gastroesophageal reflux disease without esophagitis     Past Surgical History:   Procedure Laterality Date     COMBINED ESOPHAGOSCOPY, GASTROSCOPY, DUODENOSCOPY (EGD) WITH CO2 INSUFFLATION N/A 3/20/2018    Procedure: COMBINED ESOPHAGOSCOPY, GASTROSCOPY, DUODENOSCOPY (EGD) WITH CO2 INSUFFLATION;  edg;  Surgeon: Duane, William Charles, MD;  Location: [facility] OR     ESOPHAGOSCOPY, GASTROSCOPY, DUODENOSCOPY (EGD), COMBINED N/A 3/20/2018    Procedure: COMBINED ESOPHAGOSCOPY, GASTROSCOPY, DUODENOSCOPY (EGD), BIOPSY SINGLE OR MULTIPLE;;  Surgeon: Duane, William Charles, MD;  Location: [facility] OR     SINUS SURGERY       Social History     Social History     Marital status: Single     Spouse name: N/A     Number of children: N/A     Years of education: N/A     Occupational History      [employer]     Social History Main Topics     Smoking status: Never Smoker     Smokeless tobacco: Never Used     Alcohol use No     Drug use: No     Sexual activity: Not Currently     Other Topics Concern      Service No     Blood Transfusions No     Caffeine Concern No     Occupational Exposure No     Hobby Hazards No     Sleep Concern Yes     related to nose pain     Stress Concern Yes     related to pain     Weight Concern No     Special Diet No     Back Care Yes     Exercise Yes     lifts weights     Bike Helmet No     Seat Belt Yes     Self-Exams No     Social History Narrative     No family history on file.  SUBJECTIVE FINDINGS:  A 38-year-old male presents for a left hallux nail that is ingrown.  He relates the medial nail border hurts.  He relates he has been having problems  with this since he was 11 years old.  He had a permanent removal then, but it grew back.  He relates he has had it taken out 3-4 times.  The last time was less than a year ago.  He relates it hurts on the toe.  He relates no specific injuries.  If he does not bump it, it feels better.       PAST MEDICAL HISTORY:  As noted.      PAST SURGICAL HISTORY:  He relates no problems with anesthesia in the past.      REVIEW OF SYSTEMS:  He relates no GI ulcer in the past.  He relates no kidney disease.  He relates no liver disease.      FAMILY HISTORY:  He relates is unremarkable.      OBJECTIVE FINDINGS:  DP and PT are 2/4 left.  He has a left medial hallux nail border, distally incurvated with pressure changes and some subungual debris and discoloration of the distal nail border.  There is pain on palpation of the left medial nail border distally.  There is no erythema, no odor, no calor, no drainage.       ASSESSMENT AND PLAN:  Paronychia, left distal medial nail border.  Diagnosis and treatment options discussed with the patient.  2% topical lidocaine gel applied, and the left medial nail border was debrided upon consent.  Advised patient on nail border stretching.  Bacitracin dispensed and use discussed with him, as well as 2% lidocaine gel and Band-Aids.  He will return to clinic and see me as needed or if he wants a P&A procedure for permanent removal.

## 2018-09-18 NOTE — LETTER
9/18/2018         RE: Atilio Wiley  [address] [place] [id number]        Dear Colleague,    Thank you for referring your patient, Atilio Wiley, to the [facility]. Please see a copy of my visit note below.    Past Medical History:   Diagnosis Date     A-fib (H)      Deviated nasal septum     left sided maxillary facial pain     Positive PAMELA (antinuclear antibody)      Patient Active Problem List   Diagnosis     Paroxysmal a-fib (H)     CARDIOVASCULAR SCREENING; LDL GOAL LESS THAN 160     Elevated prolactin level (H)     Constipation     Erectile dysfunction     Gastroesophageal reflux disease without esophagitis     Past Surgical History:   Procedure Laterality Date     COMBINED ESOPHAGOSCOPY, GASTROSCOPY, DUODENOSCOPY (EGD) WITH CO2 INSUFFLATION N/A 3/20/2018    Procedure: COMBINED ESOPHAGOSCOPY, GASTROSCOPY, DUODENOSCOPY (EGD) WITH CO2 INSUFFLATION;  edg;  Surgeon: Duane, William Charles, MD;  Location: [facility] OR     ESOPHAGOSCOPY, GASTROSCOPY, DUODENOSCOPY (EGD), COMBINED N/A 3/20/2018    Procedure: COMBINED ESOPHAGOSCOPY, GASTROSCOPY, DUODENOSCOPY (EGD), BIOPSY SINGLE OR MULTIPLE;;  Surgeon: Duane, William Charles, MD;  Location: [facility] OR     SINUS SURGERY       Social History     Social History     Marital status: Single     Spouse name: N/A     Number of children: N/A     Years of education: N/A     Occupational History      [employer]     Social History Main Topics     Smoking status: Never Smoker     Smokeless tobacco: Never Used     Alcohol use No     Drug use: No     Sexual activity: Not Currently     Other Topics Concern      Service No     Blood Transfusions No     Caffeine Concern No     Occupational Exposure No     Hobby Hazards No     Sleep Concern Yes     related to nose pain     Stress Concern Yes     related to pain     Weight Concern No     Special Diet No     Back Care Yes     Exercise Yes     lifts weights     Bike Helmet No     Seat Belt Yes     Self-Exams  No     Social History Narrative     No family history on file.  SUBJECTIVE FINDINGS:  A 38-year-old male presents for a left hallux nail that is ingrown.  He relates the medial nail border hurts.  He relates he has been having problems with this since he was 11 years old.  He had a permanent removal then, but it grew back.  He relates he has had it taken out 3-4 times.  The last time was less than a year ago.  He relates it hurts on the toe.  He relates no specific injuries.  If he does not bump it, it feels better.       PAST MEDICAL HISTORY:  As noted.      PAST SURGICAL HISTORY:  He relates no problems with anesthesia in the past.      REVIEW OF SYSTEMS:  He relates no GI ulcer in the past.  He relates no kidney disease.  He relates no liver disease.      FAMILY HISTORY:  He relates is unremarkable.      OBJECTIVE FINDINGS:  DP and PT are 2/4 left.  He has a left medial hallux nail border, distally incurvated with pressure changes and some subungual debris and discoloration of the distal nail border.  There is pain on palpation of the left medial nail border distally.  There is no erythema, no odor, no calor, no drainage.       ASSESSMENT AND PLAN:  Paronychia, left distal medial nail border.  Diagnosis and treatment options discussed with the patient.  2% topical lidocaine gel applied, and the left medial nail border was debrided upon consent.  Advised patient on nail border stretching.  Bacitracin dispensed and use discussed with him, as well as 2% lidocaine gel and Band-Aids.  He will return to clinic and see me as needed or if he wants a P&A procedure for permanent removal.         Again, thank you for allowing me to participate in the care of your patient.        Sincerely,        Axel Estrella DPM

## 2018-09-18 NOTE — PATIENT INSTRUCTIONS
Thanks for coming today.  Ortho/Sports Medicine Clinic  [address] [place] [address]    To schedule future appointments in Ortho Clinic, you may call [phone].    To schedule ordered imaging by your provider:   Call Central Imaging [phone]    To schedule an injection ordered by your provider:  Call Central Imaging Injection scheduling line: [phone]  Augusthart available online at:  [email or web address]    Please call if any further questions or concerns ([phone]).  Clinic hours 8 am to 5 pm.    Return to clinic (call) if symptoms worsen or fail to improve.

## 2018-09-18 NOTE — MR AVS SNAPSHOT
After Visit Summary   2018    Atilio Wliey    MRN: [id number]           Patient Information     Date Of Birth          1980        Visit Information        Provider Department      2018 3:00 PM Axel Estrella DPM [facility]        Today's Diagnoses     Ingrowing nail    -  1      Care Instructions    Thanks for coming today.  Ortho/Sports Medicine Clinic  [address] [place] [address]    To schedule future appointments in Ortho Clinic, you may call [phone].    To schedule ordered imaging by your provider:   Call Central Imaging [phone]    To schedule an injection ordered by your provider:  Call [facility] Injection scheduling line: [phone]  Leapfactor available online at:  [email or web address]    Please call if any further questions or concerns ([phone]).  Clinic hours 8 am to 5 pm.    Return to clinic (call) if symptoms worsen or fail to improve.            Follow-ups after your visit        Who to contact     If you have questions or need follow up information about today's clinic visit or your schedule please contact [facility] directly at [phone].  Normal or non-critical lab and imaging results will be communicated to you by MyChart, letter or phone within 4 business days after the clinic has received the results. If you do not hear from us within 7 days, please contact the clinic through MyChart or phone. If you have a critical or abnormal lab result, we will notify you by phone as soon as possible.  Submit refill requests through Surreal InkÂºt or call your pharmacy and they will forward the refill request to us. Please allow 3 business days for your refill to be completed.          Additional Information About Your Visit        Care EveryWhere ID     This is your Care EveryWhere ID. This could be used by other organizations to access your [facility] medical records  [id number]        Your  Vitals Were     Pulse Pulse Oximetry                55 100%           Blood Pressure from Last 3 Encounters:   09/18/18 110/66   05/02/18 100/66   04/13/18 99/63    Weight from Last 3 Encounters:   05/02/18 64.1 kg (141 lb 4.8 oz)   04/13/18 65.3 kg (143 lb 14.4 oz)   03/13/18 65.8 kg (145 lb)              We Performed the Following     DEBRIDEMENT OF NAIL(S), 1-5        Primary Care Provider Office Phone # Fax #    Chana DAVID [facility] [phone] [address] [facility] [address]        Equal Access to Services     [facility] : Hadii kayla leeo Soarianna, waaxda luqadaha, qaybta kaalmada adedevonteyada, delisa dale . So [facility] [phone].    ATENCIÓN: Si habla español, tiene a farias disposición servicios gratuitos de asistencia lingüística. Llame al [phone].    We comply with applicable federal civil rights laws and [place] laws. We do not discriminate on the basis of race, color, national origin, age, disability, sex, sexual orientation, or gender identity.            Thank you!     Thank you for choosing [facility] [[email or web address]]  for your care. Our goal is always to provide you with excellent care. Hearing back from our patients is one way we can continue to improve our services. Please take a few minutes to complete the written survey that you may receive in the mail after your visit with us. Thank you!             Your Updated Medication List - Protect others around you: Learn how to safely use, store and throw away your medicines at www.disposemymeds.org.          This list is accurate as of 9/18/18 11:59 PM.  Always use your most recent med list.                   Brand Name Dispense Instructions for use Diagnosis    mirabegron 25 MG 24 hr tablet    MYRBETRIQ    30 tablet    Take 1 tablet (25 mg) by mouth daily    Urinary frequency, Urinary urgency       MIRALAX PO           MYLANTA 200-200-20 MG/5ML Susp suspension   Generic drug:  alum & mag  hydroxide-simethicone      Take 30 mLs by mouth every 4 hours as needed for indigestion        solifenacin 5 MG tablet    VESICARE    30 tablet    Take 1 tablet (5 mg) by mouth daily    Urinary frequency, Urinary urgency       sotalol 80 MG tablet    BETAPACE     Take 80 mg by mouth daily        tiZANidine 4 MG tablet    ZANAFLEX    30 tablet    Take 1-2 tablets (4-8 mg) by mouth nightly as needed    Lumbar disc herniation       ZANTAC PO      Reported on 5/3/2017

## 2018-09-18 NOTE — NURSING NOTE
Atilio Wiley's chief complaint for this visit includes:  Chief Complaint   Patient presents with     Consult For     ingrown toenail- left great toe     PCP: Chana Poon    Referring Provider:  Referred Self, MD  No address on file    /66  Pulse 55  SpO2 100%  Severe Pain (7)     Do you need any medication refills at today's visit? no

## 2018-11-29 ENCOUNTER — OFFICE VISIT (OUTPATIENT)
Dept: OTOLARYNGOLOGY | Facility: CLINIC | Age: 38
End: 2018-11-29
Payer: COMMERCIAL

## 2018-11-29 DIAGNOSIS — J34.89 NASAL PAIN: Primary | ICD-10-CM

## 2018-11-29 PROCEDURE — 99214 OFFICE O/P EST MOD 30 MIN: CPT | Performed by: OTOLARYNGOLOGY

## 2018-11-29 ASSESSMENT — PAIN SCALES - GENERAL: PAINLEVEL: MODERATE PAIN (4)

## 2018-11-29 NOTE — MR AVS SNAPSHOT
After Visit Summary   11/29/2018    Atilio Wiley    MRN: [id number]           Patient Information     Date Of Birth          1980        Visit Information        Provider Department      11/29/2018 11:00 AM Francisco Braswell MD [facility]        Today's Diagnoses     Nasal pain    -  1       Follow-ups after your visit        Who to contact     If you have questions or need follow up information about today's clinic visit or your schedule please contact [facility] directly at [phone].  Normal or non-critical lab and imaging results will be communicated to you by MyChart, letter or phone within 4 business days after the clinic has received the results. If you do not hear from us within 7 days, please contact the clinic through MyChart or phone. If you have a critical or abnormal lab result, we will notify you by phone as soon as possible.  Submit refill requests through [employer] or call your pharmacy and they will forward the refill request to us. Please allow 3 business days for your refill to be completed.          Additional Information About Your Visit        Care EveryWhere ID     This is your Care EveryWhere ID. This could be used by other organizations to access your [place] medical records  [id number]         Blood Pressure from Last 3 Encounters:   09/18/18 110/66   05/02/18 100/66   04/13/18 99/63    Weight from Last 3 Encounters:   05/02/18 64.1 kg (141 lb 4.8 oz)   04/13/18 65.3 kg (143 lb 14.4 oz)   03/13/18 65.8 kg (145 lb)              Today, you had the following     No orders found for display       Primary Care Provider Office Phone # Fax #    DAVID Monahan [facility] [phone]       [address] [facility] [address]        Equal Access to Services     [facility] DARRELL : Deana Orozco, wajoss sultana, qaybta kaalmaabbi valle, delisa borjas. So [facility] [phone].    ATENCIÓN: Si  andres arndt, tiene a farias disposición servicios gratuitos de asistencia lingüística. Nico luna [phone].    We comply with applicable federal civil rights laws and [place] laws. We do not discriminate on the basis of race, color, national origin, age, disability, sex, sexual orientation, or gender identity.            Thank you!     Thank you for choosing [facility]  for your care. Our goal is always to provide you with excellent care. Hearing back from our patients is one way we can continue to improve our services. Please take a few minutes to complete the written survey that you may receive in the mail after your visit with us. Thank you!             Your Updated Medication List - Protect others around you: Learn how to safely use, store and throw away your medicines at www.disposemymeds.org.          This list is accurate as of 11/29/18 12:21 PM.  Always use your most recent med list.                   Brand Name Dispense Instructions for use Diagnosis    mirabegron 25 MG 24 hr tablet    MYRBETRIQ    30 tablet    Take 1 tablet (25 mg) by mouth daily    Urinary frequency, Urinary urgency       MIRALAX PO           MYLANTA 200-200-20 MG/5ML Susp suspension   Generic drug:  alum & mag hydroxide-simethicone      Take 30 mLs by mouth every 4 hours as needed for indigestion        solifenacin 5 MG tablet    VESICARE    30 tablet    Take 1 tablet (5 mg) by mouth daily    Urinary frequency, Urinary urgency       sotalol 80 MG tablet    BETAPACE     Take 80 mg by mouth daily        tiZANidine 4 MG tablet    ZANAFLEX    30 tablet    Take 1-2 tablets (4-8 mg) by mouth nightly as needed    Lumbar disc herniation       ZANTAC PO      Reported on 5/3/2017
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
## 2018-11-29 NOTE — NURSING NOTE
Atilio Wiley's goals for this visit include:   Chief Complaint   Patient presents with     Nose Problem     deviated septum, breathing difficulty       He requests these members of his care team be copied on today's visit information: Chana Poon      PCP: Chana Poon    Referring Provider:  DAVID Monahan CNP  [address] [place] [address]    There were no vitals taken for this visit.    Do you need any medication refills at today's visit? No    Krystin Whitley RN

## 2018-11-30 NOTE — PROGRESS NOTES
PATIENT:  Atilio Wiley   MRN:  [id number]   :  1980      MEDICAL DECISION MAKING:  Longstanding nasal pain.  Mr. Wiley has a complex medical and social history.  He describes having longstanding nasal pain that he finds debilitating.  He reports is difficult to function and think due to this pain.  He is able to push on the left caudal septum at the maximal aspect of his septal deviation and relieve the pain.  He is also able to push on the cephalic portion of his left upper lateral cartilage at the dorsum and relieve his nasal pain.  He feels strongly that surgery is his only answer and wants any type of surgery to correct these 2 areas.  He has undergone evaluations by multiple other ENT, Neurology and Rheumatology providers.  During his most recent Rheumatology visit, he was recommended to have a further evaluation for lupus; however, he has not completed that laboratory evaluation.      I discussed with him my personal experience with nasal reconstructions to address longstanding nasal pain.  I described how even after successfully rebuilding the nasal anatomy, the nasal pain commonly persists and at times could even worsen.  Because of this, recommended against me performing a nasal reconstruction with the primary goal of relieving his nasal pain.  We discussed other etiologies including autoimmune causes for chronic nasal pain.  He felt strongly that this was of little value and did not want to complete that evaluation.  We also discussed the possibility of trigeminal neuralgia and he reported seeing multiple neurologists and even trying medications.  He said that medications have been difficult for him and they make his thinking even cloudier.  He is only interested in having nasal surgery and is looking for a provider to perform that surgery.  Because of my prior experience at attempting to relieve nasal pain from surgery, I recommend against me performing surgery.  I did encourage him to seek a  multidisciplinary approach, where a team including a neurologist, rheumatologist and otolaryngologist collaborate to seek the optimal care for his challenging condition.      It has been a pleasure to participate in the care of Mr. Wiley.  Thank you for this referral.     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  Intranasal examination anteriorly shows no septal perforation.  He does have a significant septal deviation in the most anterior aspect of the septum both caudally and dorsally.  This creates both external and internal nasal valve collapse on the left side.  On exam, he demonstrates 2 maneuvers that relieve his pain.  The first is when he pushes on the very superior aspect of the middle one-third in the nose near the rhinion to the left.  This is at the maximal deviation of his reverse C-shaped nasal deformity.  He reports this relieves his nasal pain.  The second is when he pushes on the left side of his nasal septum at the maximal portion of his nasal septal deviation.  He had no obvious polyps or purulence.  No obvious mass is identified on anterior rhinoscopy.      I spent over 25 minutes of time with Mr. Wiley and over 50% of the time was spent in counseling and coordination of care.         Sincerely,      FRANCISCO NEGRETE MD             D: 2018   T: 2018   MT: GLORIA      Name:     [facility] WILEY   MRN:      [id number]        Account:      [id number]   :      1980      Document: [id number]       cc: Chana HERNANDEZ, CNP

## 2018-12-19 ENCOUNTER — TELEPHONE (OUTPATIENT)
Dept: UROLOGY | Facility: CLINIC | Age: 38
End: 2018-12-19

## 2018-12-19 NOTE — TELEPHONE ENCOUNTER
[facility] Call Center    Phone Message    May a detailed message be left on voicemail: no    Reason for Call: Other: Pt is requesting a call back.  Still has enlarged prostate. Medication has not worked.  Asking for a call back regarding the next step.      Action Taken: Message routed to:  Adult Clinics: Urology p [id number]

## 2018-12-19 NOTE — TELEPHONE ENCOUNTER
Nurse called patient and recommend that he see Shira Pack to follow up. He scheduled for this Friday.    Carie Sanches RN, BSN, PHN  [facility]  GI/Gen Surg/Hepatology Care Coordinator

## 2018-12-21 ENCOUNTER — OFFICE VISIT (OUTPATIENT)
Dept: UROLOGY | Facility: CLINIC | Age: 38
End: 2018-12-21
Payer: COMMERCIAL

## 2018-12-21 VITALS — DIASTOLIC BLOOD PRESSURE: 65 MMHG | HEART RATE: 82 BPM | SYSTOLIC BLOOD PRESSURE: 113 MMHG | OXYGEN SATURATION: 99 %

## 2018-12-21 DIAGNOSIS — N39.8 VOIDING DYSFUNCTION: ICD-10-CM

## 2018-12-21 DIAGNOSIS — R10.2 PELVIC PAIN IN MALE: Primary | ICD-10-CM

## 2018-12-21 DIAGNOSIS — M62.89 PELVIC FLOOR DYSFUNCTION: ICD-10-CM

## 2018-12-21 PROCEDURE — 99207 ZZC NO CHARGE LOS: CPT | Performed by: PHYSICIAN ASSISTANT

## 2018-12-21 ASSESSMENT — PAIN SCALES - GENERAL: PAINLEVEL: MODERATE PAIN (4)

## 2018-12-21 NOTE — PROGRESS NOTES
UROLOGY OFFICE VISIT - FOLLOW UP    REASON FOR VISIT: follow up pelvic pain    HPI: Mr. Atilio Wiley is a 38 year old male who returns to the urology clinic for follow up of urinary symptoms and pelvic pain. To briefly summarize, he has been following with urology for long-standing obstructive voiding symptoms with intermittent urinary retention, ongoing for the past 10-15 years. Along with this, he also complains of urinary frequency, urgency, nocturia, perineal and rectal pain, and issues with constipation and passing stool. [facility] with reportedly normal cystoscopy in 2016. Has also seen GI with recent normal colonoscopy as well as colorectal surgery in Nov 2017 who recommended defecography and referral to pelvic floor center. He has not followed up at the pelvic floor center.     With regard to his urinary symptoms, he has been treated for prostatitis with extended courses of antibiotics with no improvement. Has also tried Flomax with no improvement and this also caused some retrograde ejaculation which he did not like. He apparently has to self catheterize about once per year when he goes into spontaneous urinary retention. Last time he had to do this was in April 2017. He denies any dysuria, gross hematuria, pyuria, penile discharge, or concern for STD's. Lumbar spine MRI on 3/8/18 revealed multilevel lumbar spondylosis with L4-5 right extraforaminal disc protrusion which indents and possibly impinges the exiting right L4 nerve root. He underwent epidural injection for this on 3/27/18. Reports no change in urinary symptoms since injection.    In March 2018, he called into clinic with complaints of worsening urinary symptoms including frequency, urgency, nocturia, hesitancy, needing to strain to void, and sense of incomplete emptying. He was therefore recommended to undergo urodynamics testing which he had previously declined. This was performed on 4/11/18 at [facility]. Results  "summarized as follows:    -Small bladder capacity  -Reduced bladder compliance  -Heightened filling sensations  -No incontinence  -No DO/UDC's  -Normal detrusor pressures during voiding  -Slow flow (Qmax 5 ml/s) with incomplete bladder emptying ( mL)  -Pelvic floor tension and suspected voiding dysfunction based on increased EMG activity during voiding. No known cause for DSD in this otherwise neurologically intact patient.     Given the reduced compliance, he was started on a trial of mirabegron (chose to avoid anticholinergics 2/2 pre-existing constipation). He took this for a while and thinks it was minimally helpful but then stopped it at some point for unknown reason. He was also recommended again to see the pelvic floor center given his pelvic floor dysfunction and pelvic pain issues. He never followed up with them.    Then underwent MRI of the pelvis on 4/27/18 which showed  1. Prostate findings most likely due to inflammation with diffuse decreased T2 signal throughout the peripheral zone of the  Prostate.  2. Small focus of inflammatory change the posterior aspect of the anal  verge, possibly thrombosed hemorrhoid versus anal fissure. Recommend  correlation with physical exam.  3. Mild circumferential disc bulging at L5-S1 impressing the anterior  aspect of the spinal canal with an associated annular tear.    At the time of his last office visit on 4/13/18, he was recommended to follow up in 2 weeks for a PVR after starting Myrbetriq but was then lost to follow up.    He returns to clinic today and states that his symptoms are unchanged. Continues to complain of \"prostate\" pain and occasional difficulty urinating. His constipation and defecation issues seem to be somewhat improved.     He tells me that he feels we are \"wasting his time.\" He apparently spoke with a nurse 2 days ago to ask whether we would be able to do something at today's appointment to help him, and he was told \"just to follow up as " "scheduled.\" He asked that I be contacted prior to today's appointment, but I was never notified. There is a telephone encounter from 12/19/18 documenting that he called but I was never notified. He verbalizes ongoing frustration with his symptoms and \"lack of answers.\" States that we are \"not doing anything to help\" him.    PEx  /65 (BP Location: Left arm, Patient Position: Sitting, Cuff Size: Adult Regular)   Pulse 82   SpO2 99%   GEN: well-appearing male in NAD  RESP: no increased respiratory effort    PVR: 0 cc as measured by ultrasound at last office visit    IMAGING:   CT A/P w/contrast   5/19/16   (per records in Care Everywhere)  Impression:  Moderate amount of stool in the colon.  No other significant findings.      MR LUMBAR SPINE W/O CONTRAST 3/8/2018   Impression:   1. Multilevel lumbar spondylosis with L4-5 right extraforaminal disc  protrusion, which indents and possibly impinges the exiting right L4  nerve root.  2. Moderate right neural foraminal stenosis at L4-5 and mild left  neural foraminal stenosis at L3-4.  3. No significant spinal canal stenosis.      MR PELVIS W/O & W CONTRAST, 4/27/2018   1. Prostate findings most likely due to inflammation with diffuse decreased T2 signal throughout the peripheral zone of the  Prostate.  2. Small focus of inflammatory change the posterior aspect of the anal  verge, possibly thrombosed hemorrhoid versus anal fissure. Recommend  correlation with physical exam.  3. Mild circumferential disc bulging at L5-S1 impressing the anterior  aspect of the spinal canal with an associated annular tear.      ASSESSMENT/PLAN  38 year old male with chronic constipation (seems to be improved), pelvic pain, obstructive voiding symptoms with infrequent episodes of spontaneous urinary retention for which he self-catheterizes as needed (occurs maybe once per year), and worsening urinary frequency, urgency, and nocturia, as well as pelvic pain and pelvic floor " "dysfunction.    UDS in April 2018 demonstrated a small capacity bladder due to low compliance, heightened filling sensations, no DO or incontinence, normal detrusor pressures during voiding but slow flow with incomplete bladder emptying and notable pelvic floor tension during bladder filling and voiding. The exact etiology for his impaired bladder compliance is not clear, but could be related to lumbar spine DDD and herniated discs. Suspect his slow flow with incomplete emptying is secondary to pelvic floor/voiding dysfunction as he has no other evidence for bladder outlet obstruction (negative cystoscopy in 2016 with outside urologist), and has no known neurological reason to have DSD.     He has failed numerous courses of extended antibiotics for possible prostatitis and did not see significant improvement with mirabegron. He has been recommended by both urology and colorectal to see the pelvic floor center but he has refused to do this as he does not believe it will help him.     Today, the patient is visibly frustrated with his situation and states that we are \"wasting his time\" and that we are \"not doing anything to help\" him. He asks about a possible \"steam procedure\" for the prostate or some \"injection into his leg to reduce the blood flow to the prostate.\" I informed him that I was unaware of these types of procedures to help with pelvic pain.  -Strongly encouraged him to follow up with the pelvic floor center but he again declines.  -I therefore offered for the patient to meet with another urologist for a second opinion.  -He would like to do this. Schedule with Dr. Reno or Dr. Brantley next available.   -Today's visit will be billed as no charge.     10 minutes spent with the patient, >50% in counseling and coordination of care.    Shira Pack PA-C  Department of Urology    "

## 2018-12-21 NOTE — NURSING NOTE
Atilio Wiley's goals for this visit include:   Chief Complaint   Patient presents with     Follow Up     Pt states things are not going well, has concerns with his prostate problems.        He requests these members of his care team be copied on today's visit information:     PCP: Chana Poon    Referring Provider:  No referring provider defined for this encounter.    /65 (BP Location: Left arm, Patient Position: Sitting, Cuff Size: Adult Regular)   Pulse 82   SpO2 99%     Do you need any medication refills at today's visit? No    Delaney Whittaker LPN

## 2019-02-21 ENCOUNTER — OFFICE VISIT (OUTPATIENT)
Dept: PODIATRY | Facility: CLINIC | Age: 39
End: 2019-02-21
Payer: COMMERCIAL

## 2019-02-21 VITALS — DIASTOLIC BLOOD PRESSURE: 66 MMHG | SYSTOLIC BLOOD PRESSURE: 117 MMHG | OXYGEN SATURATION: 100 % | HEART RATE: 68 BPM

## 2019-02-21 DIAGNOSIS — L60.0 INGROWING NAIL: Primary | ICD-10-CM

## 2019-02-21 PROCEDURE — 11750 EXCISION NAIL&NAIL MATRIX: CPT | Mod: LT | Performed by: PODIATRIST

## 2019-02-21 ASSESSMENT — PAIN SCALES - GENERAL: PAINLEVEL: MODERATE PAIN (4)

## 2019-02-21 NOTE — PATIENT INSTRUCTIONS
Thanks for coming today.  [facility] Medicine Clinic  [address] [place] [address]    To schedule future appointments in Ortho Clinic, you may call [phone].    To schedule ordered imaging by your provider:   Call [facility] [phone]    To schedule an injection ordered by your provider:  Call [facility] Injection scheduling line: [phone]  [facility] [employer] available online at:  [email or web address]    Please call if any further questions or concerns ([phone]).  Clinic hours 8 am to 5 pm.    Return to clinic (call) if symptoms worsen or fail to improve.

## 2019-02-21 NOTE — PROGRESS NOTES
Past Medical History:   Diagnosis Date     A-fib (H)      Deviated nasal septum     left sided maxillary facial pain     Positive PAMELA (antinuclear antibody)      Patient Active Problem List   Diagnosis     Paroxysmal A-fib (H)     CARDIOVASCULAR SCREENING; LDL GOAL LESS THAN 160     Elevated prolactin level (H)     Constipation     Erectile dysfunction     Gastroesophageal reflux disease without esophagitis     Past Surgical History:   Procedure Laterality Date     COMBINED ESOPHAGOSCOPY, GASTROSCOPY, DUODENOSCOPY (EGD) WITH CO2 INSUFFLATION N/A 3/20/2018    Procedure: COMBINED ESOPHAGOSCOPY, GASTROSCOPY, DUODENOSCOPY (EGD) WITH CO2 INSUFFLATION;  edg;  Surgeon: Duane, William Charles, MD;  Location: [facility]     ESOPHAGOSCOPY, GASTROSCOPY, DUODENOSCOPY (EGD), COMBINED N/A 3/20/2018    Procedure: COMBINED ESOPHAGOSCOPY, GASTROSCOPY, DUODENOSCOPY (EGD), BIOPSY SINGLE OR MULTIPLE;;  Surgeon: Duane, William Charles, MD;  Location: [facility]     SINUS SURGERY       Social History     Socioeconomic History     Marital status: Single     Spouse name: Not on file     Number of children: Not on file     Years of education: Not on file     Highest education level: Not on file   Occupational History     Employer: [employer]     Comment: [employer]   Social Needs     Financial resource strain: Not on file     Food insecurity:     Worry: Not on file     Inability: Not on file     Transportation needs:     Medical: Not on file     Non-medical: Not on file   Tobacco Use     Smoking status: Never Smoker     Smokeless tobacco: Never Used   Substance and Sexual Activity     Alcohol use: No     Drug use: No     Sexual activity: Not Currently   Lifestyle     Physical activity:     Days per week: Not on file     Minutes per session: Not on file     Stress: Not on file   Relationships     Social connections:     Talks on phone: Not on file     Gets together: Not on file     Attends Voodoo service: Not on file     Active member of club or  organization: Not on file     Attends meetings of clubs or organizations: Not on file     Relationship status: Not on file     Intimate partner violence:     Fear of current or ex partner: Not on file     Emotionally abused: Not on file     Physically abused: Not on file     Forced sexual activity: Not on file   Other Topics Concern      Service No     Blood Transfusions No     Caffeine Concern No     Occupational Exposure No     Hobby Hazards No     Sleep Concern Yes     Comment: related to nose pain     Stress Concern Yes     Comment: related to pain     Weight Concern No     Special Diet No     Back Care Yes     Exercise Yes     Comment: lifts weights     Bike Helmet No     Seat Belt Yes     Self-Exams No     Parent/sibling w/ CABG, MI or angioplasty before 65F 55M? Not Asked   Social History Narrative     Not on file     No family history on file.  SUBJECTIVE FINDINGS:  A 38-year-old male returns to clinic for left medial hallux nail border ingrown.  This has been bothering him.  He relates that we trimmed it out and it felt better, but then it grew back.  He relates he had it taken out, he thinks, permanently as a kid.  He is not sure what they put in it.  He relates he had it taken out once more recently, and they did not have any phenol, so they did not put that in there.  But it keeps regrowing and he wants it taken out permanently.      OBJECTIVE FINDINGS:  DP and PT are 2/4 left.  CFT is less than 3 seconds.  He has a left medial hallux nail border that is incurvated with some subungual debris and ecchymosis distally.  There is pain on palpation.  There is no erythema, no drainage, no odor.  Minimal edema.       ASSESSMENT AND PLAN:  Paronychia, left medial hallux nail border.  Diagnosis and treatment options discussed with the patient.  Patient requests P&A procedure.       PROCEDURE:  Potential risks, expected benefits, expected outcomes and followup discussed with him.  Consent signed.  The left  hallux was anesthetized with 5 mL of 1% lidocaine plain using a hallux digital block.  The left foot was prepped and draped in sterile technique.  Anesthesia was checked and achieved.  The left hallux was exangiunated and a Penrose tourniquet was applied to the base of the Hallux.  The left medial hallux nail border was freed from its margins using a dermal curette.  The left medial Hallux nail border was removed using English anvil and a hemostat.  Phenol was applied to the left medial hallux nail border using EZ Swab Phenol Swabs x3 for about 30 seconds each.  The nail border was copiously irrigated with rubbing alcohol.  The tourniquet was released.  Bacitracin and a sterile compression dressing were applied.  CFT returned to preop levels after release of the tourniquet.  Patient tolerated the procedure and anesthesia well with no complications.  He opted for ibuprofen for pain management.  Advised him on foot soaks.  Dressings dispensed.  Bacitracin dispensed and use discussed with him.  He will return to clinic and see me in 1 week.

## 2019-02-21 NOTE — LETTER
2/21/2019         RE: Atilio Wiley  [address] [place] [id number]        Dear Colleague,    Thank you for referring your patient, Atilio Wiley, to the [facility]. Please see a copy of my visit note below.    Past Medical History:   Diagnosis Date     A-fib (H)      Deviated nasal septum     left sided maxillary facial pain     Positive PAMELA (antinuclear antibody)      Patient Active Problem List   Diagnosis     Paroxysmal A-fib (H)     CARDIOVASCULAR SCREENING; LDL GOAL LESS THAN 160     Elevated prolactin level (H)     Constipation     Erectile dysfunction     Gastroesophageal reflux disease without esophagitis     Past Surgical History:   Procedure Laterality Date     COMBINED ESOPHAGOSCOPY, GASTROSCOPY, DUODENOSCOPY (EGD) WITH CO2 INSUFFLATION N/A 3/20/2018    Procedure: COMBINED ESOPHAGOSCOPY, GASTROSCOPY, DUODENOSCOPY (EGD) WITH CO2 INSUFFLATION;  edg;  Surgeon: Duane, William Charles, MD;  Location: [facility]     ESOPHAGOSCOPY, GASTROSCOPY, DUODENOSCOPY (EGD), COMBINED N/A 3/20/2018    Procedure: COMBINED ESOPHAGOSCOPY, GASTROSCOPY, DUODENOSCOPY (EGD), BIOPSY SINGLE OR MULTIPLE;;  Surgeon: Duane, William Charles, MD;  Location: [facility]     SINUS SURGERY       Social History     Socioeconomic History     Marital status: Single     Spouse name: Not on file     Number of children: Not on file     Years of education: Not on file     Highest education level: Not on file   Occupational History     Employer: [employer]     Comment: [employer]   Social Needs     Financial resource strain: Not on file     Food insecurity:     Worry: Not on file     Inability: Not on file     Transportation needs:     Medical: Not on file     Non-medical: Not on file   Tobacco Use     Smoking status: Never Smoker     Smokeless tobacco: Never Used   Substance and Sexual Activity     Alcohol use: No     Drug use: No     Sexual activity: Not Currently   Lifestyle     Physical activity:     Days per week: Not on file      Minutes per session: Not on file     Stress: Not on file   Relationships     Social connections:     Talks on phone: Not on file     Gets together: Not on file     Attends Adventism service: Not on file     Active member of club or organization: Not on file     Attends meetings of clubs or organizations: Not on file     Relationship status: Not on file     Intimate partner violence:     Fear of current or ex partner: Not on file     Emotionally abused: Not on file     Physically abused: Not on file     Forced sexual activity: Not on file   Other Topics Concern      Service No     Blood Transfusions No     Caffeine Concern No     Occupational Exposure No     Hobby Hazards No     Sleep Concern Yes     Comment: related to nose pain     Stress Concern Yes     Comment: related to pain     Weight Concern No     Special Diet No     Back Care Yes     Exercise Yes     Comment: lifts weights     Bike Helmet No     Seat Belt Yes     Self-Exams No     Parent/sibling w/ CABG, MI or angioplasty before 65F 55M? Not Asked   Social History Narrative     Not on file     No family history on file.  SUBJECTIVE FINDINGS:  A 38-year-old male returns to clinic for left medial hallux nail border ingrown.  This has been bothering him.  He relates that we trimmed it out and it felt better, but then it grew back.  He relates he had it taken out, he thinks, permanently as a kid.  He is not sure what they put in it.  He relates he had it taken out once more recently, and they did not have any phenol, so they did not put that in there.  But it keeps regrowing and he wants it taken out permanently.      OBJECTIVE FINDINGS:  DP and PT are 2/4 left.  CFT is less than 3 seconds.  He has a left medial hallux nail border that is incurvated with some subungual debris and ecchymosis distally.  There is pain on palpation.  There is no erythema, no drainage, no odor.  Minimal edema.       ASSESSMENT AND PLAN:  Paronychia, left medial hallux nail  border.  Diagnosis and treatment options discussed with the patient.  Patient requests P&A procedure.       PROCEDURE:  Potential risks, expected benefits, expected outcomes and followup discussed with him.  Consent signed.  The left hallux was anesthetized with 5 mL of 1% lidocaine plain using a hallux digital block.  The left foot was prepped and draped in sterile technique.  Anesthesia was checked and achieved.  The left hallux was exangiunated and a Penrose tourniquet was applied to the base of the Hallux.  The left medial hallux nail border was freed from its margins using a dermal curette.  The left medial Hallux nail border was removed using English anvil and a hemostat.  Phenol was applied to the left medial hallux nail border using EZ Swab Phenol Swabs x3 for about 30 seconds each.  The nail border was copiously irrigated with rubbing alcohol.  The tourniquet was released.  Bacitracin and a sterile compression dressing were applied.  CFT returned to preop levels after release of the tourniquet.  Patient tolerated the procedure and anesthesia well with no complications.  He opted for ibuprofen for pain management.  Advised him on foot soaks.  Dressings dispensed.  Bacitracin dispensed and use discussed with him.  He will return to clinic and see me in 1 week.           Again, thank you for allowing me to participate in the care of your patient.        Sincerely,        Axel Estrella DPM

## 2019-02-21 NOTE — NURSING NOTE
Atilio Wiley's chief complaint for this visit includes:  Chief Complaint   Patient presents with     RECHECK     left foot big toe nail     PCP: Chana Poon    Referring Provider:  No referring provider defined for this encounter.    /66 (BP Location: Left arm, Patient Position: Sitting, Cuff Size: Adult Regular)   Pulse 68   SpO2 100%   Moderate Pain (4)     Do you need any medication refills at today's visit? no

## 2019-07-01 ENCOUNTER — TELEPHONE (OUTPATIENT)
Dept: RHEUMATOLOGY | Facility: CLINIC | Age: 39
End: 2019-07-01

## 2019-07-01 NOTE — TELEPHONE ENCOUNTER
[facility] Call Center    Phone Message    May a detailed message be left on voicemail: yes    Reason for Call: Order(s): Other:   Reason for requested: Patient wants orders placed for blood work for lupus and lymes disease, Please advise.  Date needed:   Provider name: Dr. Rodriguez      Action Taken: Message routed to:  Adult Clinics: Rheumatology p [phone]

## 2019-07-02 NOTE — TELEPHONE ENCOUNTER
Called patient. He stated he does not have a PCP. Assisted him in making an appointment for Friday July 5th.     MINISTERIO JonesN, RN   Rheumatology Care Coordinator   [facility]

## 2019-07-04 ENCOUNTER — NURSE TRIAGE (OUTPATIENT)
Dept: NURSING | Facility: CLINIC | Age: 39
End: 2019-07-04

## 2019-07-04 NOTE — TELEPHONE ENCOUNTER
Right side kidney lesion.  Upper right red [place] dime sized that is raised he has about 15.  Not itchy    Additional Information    Negative: Sounds like a life-threatening emergency to the triager    Negative: Possible contact with poison ivy or oak    Negative: Insect bite(s) suspected    Negative: Athlete's Foot suspected (i.e., itchy rash between the toes)    Negative: Jock Itch suspected (i.e., itchy rash on inner thighs near genital area)    Negative: Wound infection suspected (i.e., pain, spreading redness, or pus; in a cut, puncture, scrape or sutured wound)    Negative: Rash of external female genital area (vulva)    Negative: Rash of penis or scrotum    Negative: Small spot, skin growth, or mole    Negative: Fever and localized purple or blood-colored spots or dots that are not from injury or friction    Negative: Fever and localized rash is very painful    Negative: Patient sounds very sick or weak to the triager    Negative: Looks like a boil, infected sore, deep ulcer, or other infected rash (spreading redness, pus)    Negative: Painful rash with multiple small blisters grouped together (i.e., dermatomal distribution or 'band' or 'stripe')    Negative: Localized rash is very painful (no fever)    Negative: Localized purple or blood-colored spots or dots that are not from injury or friction (no fever)    Negative: Patient wants to be seen    Negative: Lyme disease suspected (e.g., bull's-eye rash or tick bite / exposure)    Negative: Tender bumps in armpits    Negative: Pimples (localized) and no improvement after using CARE ADVICE    Negative: SEVERE local itching persists after 2 days of steroid cream    Negative: Applying cream or ointment and it causes severe itch, burning, or pain    Negative: Localized rash present > 7 days    Negative: Red, moist, irritated area between skin folds (or under larger breasts)    Mild localized rash    Answer Assessment - Initial Assessment Questions  1. APPEARANCE  "of RASH: \"Describe the rash.\"       15 spots that are about a dime sized  2. LOCATION: \"Where is the rash located?\"       Upper right quad  3. NUMBER: \"How many spots are there?\"       15  4. SIZE: \"How big are the spots?\" (Inches, centimeters or compare to size of a coin)       Dime sized  5. ONSET: \"When did the rash start?\"       today  6. ITCHING: \"Does the rash itch?\" If so, ask: \"How bad is the itch?\"  (Scale 1-10; or mild, moderate, severe)      no  7. PAIN: \"Does the rash hurt?\" If so, ask: \"How bad is the pain?\"  (Scale 1-10; or mild, moderate, severe)      Headache and swollen glands  8. OTHER SYMPTOMS: \"Do you have any other symptoms?\" (e.g., fever)      No draining  9. PREGNANCY: \"Is there any chance you are pregnant?\" \"When was your last menstrual period?\"      no    Protocols used: RASH OR REDNESS - [id number]      "

## 2019-07-05 ENCOUNTER — OFFICE VISIT (OUTPATIENT)
Dept: RHEUMATOLOGY | Facility: CLINIC | Age: 39
End: 2019-07-05
Payer: COMMERCIAL

## 2019-07-05 VITALS
WEIGHT: 142.2 LBS | BODY MASS INDEX: 22.27 KG/M2 | DIASTOLIC BLOOD PRESSURE: 65 MMHG | HEART RATE: 60 BPM | OXYGEN SATURATION: 99 % | SYSTOLIC BLOOD PRESSURE: 102 MMHG

## 2019-07-05 DIAGNOSIS — R76.8 POSITIVE ANA (ANTINUCLEAR ANTIBODY): Primary | ICD-10-CM

## 2019-07-05 LAB
CRP SERPL-MCNC: <2.9 MG/L (ref 0–8)
ERYTHROCYTE [SEDIMENTATION RATE] IN BLOOD BY WESTERGREN METHOD: 9 MM/H (ref 0–15)

## 2019-07-05 PROCEDURE — 86225 DNA ANTIBODY NATIVE: CPT | Performed by: STUDENT IN AN ORGANIZED HEALTH CARE EDUCATION/TRAINING PROGRAM

## 2019-07-05 PROCEDURE — 86140 C-REACTIVE PROTEIN: CPT | Performed by: STUDENT IN AN ORGANIZED HEALTH CARE EDUCATION/TRAINING PROGRAM

## 2019-07-05 PROCEDURE — 85652 RBC SED RATE AUTOMATED: CPT | Performed by: STUDENT IN AN ORGANIZED HEALTH CARE EDUCATION/TRAINING PROGRAM

## 2019-07-05 PROCEDURE — 86803 HEPATITIS C AB TEST: CPT | Performed by: STUDENT IN AN ORGANIZED HEALTH CARE EDUCATION/TRAINING PROGRAM

## 2019-07-05 PROCEDURE — 86160 COMPLEMENT ANTIGEN: CPT | Performed by: STUDENT IN AN ORGANIZED HEALTH CARE EDUCATION/TRAINING PROGRAM

## 2019-07-05 PROCEDURE — 86235 NUCLEAR ANTIGEN ANTIBODY: CPT | Performed by: STUDENT IN AN ORGANIZED HEALTH CARE EDUCATION/TRAINING PROGRAM

## 2019-07-05 PROCEDURE — 99214 OFFICE O/P EST MOD 30 MIN: CPT | Performed by: STUDENT IN AN ORGANIZED HEALTH CARE EDUCATION/TRAINING PROGRAM

## 2019-07-05 PROCEDURE — 00000402 ZZHCL STATISTIC TOTAL PROTEIN: Performed by: STUDENT IN AN ORGANIZED HEALTH CARE EDUCATION/TRAINING PROGRAM

## 2019-07-05 PROCEDURE — 84165 PROTEIN E-PHORESIS SERUM: CPT | Performed by: STUDENT IN AN ORGANIZED HEALTH CARE EDUCATION/TRAINING PROGRAM

## 2019-07-05 PROCEDURE — 36415 COLL VENOUS BLD VENIPUNCTURE: CPT | Performed by: STUDENT IN AN ORGANIZED HEALTH CARE EDUCATION/TRAINING PROGRAM

## 2019-07-05 NOTE — NURSING NOTE
Atilio Wiley's goals for this visit include: f/u positive PAMELA - Request for lupus testing    He requests these members of his care team be copied on today's visit information: yes    PCP: Shira Pack    Referring Provider:  Shira Pack PA-C  [address] [place] [address]    /65   Pulse 60   Wt 64.5 kg (142 lb 3.2 oz)   SpO2 99%   BMI 22.27 kg/m

## 2019-07-05 NOTE — LETTER
January 22, 2020      Atilio Wiley  [address] [facility] [address]        Dear ,    We are writing to inform you of your test results.    All your blood tests for autoimmune diseases like Lupus, Sjogren's, Scleroderma were negative. Inflammation markers are normal. Likelihood of autoimmune connective tissue disease as the cause of your symptoms is very low.       Resulted Orders   MARYANN antibody panel   Result Value Ref Range    RNP Antibody IgG 0.2 0.0 - 0.9 AI      Comment:      Negative  Antibody index (AI) values reflect qualitative changes in antibody   concentration that cannot be directly associated with clinical condition or   disease state.      Ray MARYANN Antibody IgG <0.2 0.0 - 0.9 AI      Comment:      Negative  Antibody index (AI) values reflect qualitative changes in antibody   concentration that cannot be directly associated with clinical condition or   disease state.      SSA (Ro) (MARYANN) Antibody, IgG <0.2 0.0 - 0.9 AI      Comment:      Negative  Antibody index (AI) values reflect qualitative changes in antibody   concentration that cannot be directly associated with clinical condition or   disease state.      SSB (La) (MARYANN) Antibody, IgG <0.2 0.0 - 0.9 AI      Comment:      Negative  Antibody index (AI) values reflect qualitative changes in antibody   concentration that cannot be directly associated with clinical condition or   disease state.      Scleroderma Antibody Scl-70 MARYANN IgG 0.3 0.0 - 0.9 AI      Comment:      Negative  Antibody index (AI) values reflect qualitative changes in antibody   concentration that cannot be directly associated with clinical condition or   disease state.     Complement C3   Result Value Ref Range    Complement C3 90 76 - 169 mg/dL   Complement C4   Result Value Ref Range    Complement C4 27 15 - 50 mg/dL   DNA ANTIBODY, NATV/2 STRAND   Result Value Ref Range    DNA-ds 3 <10 IU/mL      Comment:      Negative   Centromere Antibody IgG   Result Value Ref Range     Centromere Antibody IgG <0.2 0.0 - 0.9 AI      Comment:      Negative  Antibody index (AI) values reflect qualitative changes in antibody   concentration that cannot be directly associated with clinical condition or   disease state.     Erythrocyte sedimentation rate auto   Result Value Ref Range    Sed Rate 9 0 - 15 mm/h   CRP inflammation   Result Value Ref Range    CRP Inflammation <2.9 0.0 - 8.0 mg/L   Protein electrophoresis   Result Value Ref Range    Albumin Fraction 5.0 3.7 - 5.1 g/dL    Alpha 1 Fraction 0.4 0.2 - 0.4 g/dL    Alpha 2 Fraction 0.9 0.5 - 0.9 g/dL    Beta Fraction 0.9 0.6 - 1.0 g/dL    Gamma Fraction 1.5 0.7 - 1.6 g/dL    Monoclonal Peak 0.0 0.0 g/dL    ELP Interpretation:       Essentially normal electrophoretic pattern. No monoclonal protein seen. Pathologic   significance requires clinical correlation. BHARGAVI Pascal M.D., Pathologist.      Hepatitis C antibody   Result Value Ref Range    Hepatitis C Antibody Nonreactive NR^Nonreactive      Comment:      Assay performance characteristics have not been established for newborns,   infants, and children         If you have any questions or concerns, please call the clinic at the number listed above.       Sincerely,        Dina Rodriguez MD

## 2019-07-05 NOTE — PROGRESS NOTES
Rheumatology Clinic Visit     Atilio Wiley MRN# [id number]   YOB: 1980 Age: 37 year old     Date of Visit: Jul 5, 2019  Primary care provider: Chana Poon          Assessment and Plan:   Assessment     -- Chronic abdominal and pelvic pain  -- Obstructive voiding symptoms   -- Erectile dysfunction  -- GERD  -- Hand pain  -- Lumbar degenerative disc disease.   -- Positive PAMELA-1: 160 homogenous      Mr. Brown is 37-year-old male seen in clinic for evaluation of chronic abdominal pain and hand pain in the setting of positive PAMELA.     Chronic abdominal pain: His main concern is chronic pain in the left lumbar quadrant for the last 10 years.  He has seen gastroenterologist and had colonoscopy and upper GI endoscopy along with CT abdomen and recent MRI pelvis which were unremarkable except for evidence of prostatitis. Exact cause of chronic abdominal pain is unknown.  He also has obstructive voiding symptoms and complains of urinary urgency and increased frequency.  He has seen urologist extensive testing has not revealed any abnormality. UDS demonstrate a small capacity bladder due to low compliance, heightened filling sensations, slow flow with incomplete bladder emptying and notable pelvic floor tension during bladder filling and voiding.     I recommended him to follow up with GI and consider motility studies. If all the testing comes back normal then he can consider to see pain clinic for chronic Abdominal pain management.     Positive PAMELA: He has positive PAMELA of 1: 160 homogenous.  Complains of mild pain in his hands but does not have any evidence of synovitis, tenderness on exam.  His rheumatoid serologies tested were normal.  He denies having raynaud's, fatigue, flulike symptoms, pleurisy, oral sores, sicca symptoms or history of blood clots.  His presentation is not suggestive of autoimmune connective tissue disease. I will get basic autoimmune serologies for lupus and other connective tissue  diseases but Autoimmune connective tissue disease seems unlikely.    His presentation is not suggestive of small vessel or medium vessel vasculitis.  He denies having Skin rashes, hemoptysis, hematuria, focal neurologic deficits.     Plan     -- Blood tests : MARYANN panel, complements, double-stranded DNA, centromere, protein electrophoresis, hepatitis C    -- He can ask GI doctor about utility of motility studies if they are not done.     -- For chronic abdominal pain if no other cause is found then see pain specialist.     -- RTC on as needed basis.           Active Problem List:     Patient Active Problem List    Diagnosis Date Noted     Gastroesophageal reflux disease without esophagitis 05/10/2017     Priority: Medium     Elevated prolactin level (H) 02/11/2016     Priority: Medium     Constipation 02/11/2016     Priority: Medium     Erectile dysfunction 02/11/2016     Priority: Medium     CARDIOVASCULAR SCREENING; LDL GOAL LESS THAN 160 10/31/2010     Priority: Medium     Paroxysmal A-fib (H) 02/19/2009     Priority: Medium            History of Present Illness:   Atilio Wiley is a 37 year old male with PMH of erectile dysfunction, constipation, GERD, paroxysmal A. fib seen in the clinic in consultation at request of Denise Ray for evaluation of abdominal pain and hand pain in setting of positive PAMELA.    July 5, 2019 - He has pain in his fingers mostly 3/10 in intensity. He drives trucks and steers all day. Right 4th PIP joint can go up 8/10. He denies any pain in other joints. He has urinary complaints like urgency, difficulty with micturition but his studies are normal. He has chronic abdominal pain in left lumbar area but none of the GI work up has shown any abnormality.     History from initial visit : He complains of chronic dull pain in the left lumbar quadrant for the last 10 years.  He has seen gastroenterologist and had upper and lower GI endoscopy was normal.  He had a CT abdomen in 2016 which was  normal and had a recent MRI pelvis a week ago which showed evidence of prostatitis.      He also follows up with urologist for obstructive voiding symptoms. He also complains of urinary frequency, urgency, nocturia, and some perineal and rectal pain and issues with constipation and passing stool. Had normal cystoscopy in 2016. Has also seen GI with recent normal colonoscopy as well as colorectal surgery in Nov 2017 who recommended defecography and referral to pelvic floor center.     He denies significant pain in his hands or other joints but notice mild stiffness and ache in his hands after driving for a long time.  He denies any stiffness in the morning, fatigue, night sweats or fevers.  There is no family history of rheumatoid arthritis or other autoimmune diseases. Denies any raynauds, malar rash, photosensitivity, recurrent mouth/genital ulcers, sicca symptoms, pleuritic chest pains, recurrent sinusitis/rhinitis, swallowing difficulty, hearing or visual changes recently. No h/o arterial/venous thrombosis in the past.     He also complains of chronic pain on his nose specifically located on his left nasal bone. When he pushes on it very tender and uncomfortable. He was seen by ENT specialist Dr. Lorenzana who found no significant structural abnormalities, masses or lesions. The tissues overall looked very healthy.            Review of Systems:   Review Of Systems  Constitutional: denies fever, chills, night sweats and weight loss.  Skin: No skin rash.  Eyes: No dryness or irritation in eyes. No episode of eye inflammation or redness.   Ears/Nose/Throat: no recurrent sinus infections.  Respiratory: No shortness of breath, dyspnea on exertion, cough, or hemoptysis  Cardiovascular: no chest pain or palpitations.  Gastrointestinal: no nausea, vomiting, abdominal pain.  Normal bowel movements.  Genitourinary: no dysuria, frequency  or hematuria.  Musculoskeletal: as in HPI  Neurologic: no numbness, tingling.  Psychiatric:  no mood disorders.  Hematologic/Lymphatic/Immunologic: no history of easy bruising, petechia or purpura.  No abnormal bleeding.   Endocrine: no h/o thyroid disease or Diabetes.                  Past Medical History:     Past Medical History:   Diagnosis Date     A-fib (H)      Deviated nasal septum     left sided maxillary facial pain     Positive PAMELA (antinuclear antibody)      Past Surgical History:   Procedure Laterality Date     COMBINED ESOPHAGOSCOPY, GASTROSCOPY, DUODENOSCOPY (EGD) WITH CO2 INSUFFLATION N/A 3/20/2018    Procedure: COMBINED ESOPHAGOSCOPY, GASTROSCOPY, DUODENOSCOPY (EGD) WITH CO2 INSUFFLATION;  edg;  Surgeon: Duane, William Charles, MD;  Location: [facility] OR     ESOPHAGOSCOPY, GASTROSCOPY, DUODENOSCOPY (EGD), COMBINED N/A 3/20/2018    Procedure: COMBINED ESOPHAGOSCOPY, GASTROSCOPY, DUODENOSCOPY (EGD), BIOPSY SINGLE OR MULTIPLE;;  Surgeon: Duane, William Charles, MD;  Location: [facility] OR     SINUS SURGERY              Social History:     Social History     Occupational History     Employer: [employer]     Comment: [employer]   Tobacco Use     Smoking status: Never Smoker     Smokeless tobacco: Never Used   Substance and Sexual Activity     Alcohol use: No     Drug use: No     Sexual activity: Not Currently            Family History:   No family history on file.         Allergies:     Allergies   Allergen Reactions     Bactrim [Sulfamethoxazole W/Trimethoprim] Rash            Medications:     Current Outpatient Medications   Medication Sig Dispense Refill     alum & mag hydroxide-simethicone (MYLANTA) 200-200-20 MG/5ML SUSP suspension Take 30 mLs by mouth every 4 hours as needed for indigestion       mirabegron (MYRBETRIQ) 25 MG 24 hr tablet Take 1 tablet (25 mg) by mouth daily (Patient not taking: Reported on 7/5/2019) 30 tablet 5     Polyethylene Glycol 3350 (MIRALAX PO)        solifenacin (VESICARE) 5 MG tablet Take 1 tablet (5 mg) by mouth daily (Patient not taking: Reported on 7/5/2019) 30 tablet 5      sotalol (BETAPACE) 80 MG tablet Take 80 mg by mouth daily        tiZANidine (ZANAFLEX) 4 MG tablet Take 1-2 tablets (4-8 mg) by mouth nightly as needed (Patient not taking: Reported on 7/5/2019) 30 tablet 1     ZANTAC OR Reported on 5/3/2017              Physical Exam:   Blood pressure 102/65, pulse 60, weight 64.5 kg (142 lb 3.2 oz), SpO2 99 %.  Wt Readings from Last 4 Encounters:   07/05/19 64.5 kg (142 lb 3.2 oz)   05/02/18 64.1 kg (141 lb 4.8 oz)   04/13/18 65.3 kg (143 lb 14.4 oz)   03/13/18 65.8 kg (145 lb)       Constitutional: Thin Build, appearing stated age; cooperative  Eyes: nl EOM, PERRLA, vision, conjunctiva, sclera  ENT: nl external ears, nose, hearing, lips, teeth, gums, throat  No mucous membrane lesions, normal saliva pool  Neck: no mass or thyroid enlargement  Resp: lungs clear to auscultation, nl to palpation  CV: RRR, no murmurs, rubs or gallops, no edema  GI: no ABD mass or tenderness, no HSM  : not tested  Lymph: no cervical, supraclavicular, inguinal or epitrochlear nodes    MS: All TMJ, neck, shoulder, elbow, wrist, MCP/PIP/DIP, spine, hip, knee, ankle, and foot MTP/IP joints were examined.   -- No active synovitis or deformity. Full ROM.  Normal  strength.   -- No dactylitis,  tenosynovitis, enthesopathy.    Skin: no nail pitting, alopecia, rash, nodules or lesions  Neuro: nl cranial nerves, strength, sensation, DTRs.   Psych: nl judgement, orientation, memory, affect.         Data:     Results for orders placed or performed during the hospital encounter of 04/27/18   MRI Pelvis w & w/o contrast    Narrative    EXAMINATION: MR PELVIS W/O & W CONTRAST, 4/27/2018 5:35 PM     COMPARISON: No similar study. CT exam 5/19/2016.    TECHNIQUE:  Images were acquired without and with intravenous contrast  through the pelvis. The following MR images were acquired without  contrast: multiplanar T1, multiplanar T2, axial diffusion-weighted and  axial apparent diffusion coefficient. T1-weighted  images with fat  saturation were acquired at the following intervals relative to  intravenous contrast administration: pre-contrast, immediate post  contrast, 1 minute, 3 minutes and delayed.  Contrast dose: 7cc  Gadavist Injected    HISTORY: Chronic pelvic and rectal pain. Previous negative CT, urine  testing, cystoscopy.    FINDINGS:    Visualized loops of bowel are within normal limits including the  sigmoid and rectum. No evidence for fistulous tracts, abscesses,  strictures or fluid collections. There is a small amount of  inflammatory change at the posterior aspect of the anus, with some  restricted diffusion, enhancement, T2 hyperintensity.    Small sub-4 mm pelvic lymph nodes without suspicious features. No  inguinal lymphadenopathy. Symmetric seminal vesicles. Partially  visualized urinary bladder, unremarkable. No free fluid in the pelvis.  Diffuse decreased T2 signal throughout the peripheral zone of the  prostate, likely due to inflammation. Degenerative changes of the  lower lumbar spine. Mild circumferential disc bulging at L5-S1  impressing the anterior aspect of the spinal canal with an associated  annular tear, series 4 image 17.      Impression    Impression:  1. Prostate findings most likely due to inflammation, as described  above.  2. Small focus of inflammatory change the posterior aspect of the anal  verge, possibly thrombosed hemorrhoid versus anal fissure. Recommend  correlation with physical exam.  3. Mild circumferential disc bulging at L5-S1 impressing the anterior  aspect of the spinal canal with an associated annular tear.    I have personally reviewed the examination and initial interpretation  and I agree with the findings.    CHRISTOPHER WASHINGTON       Recent Labs   Lab Test  02/13/18   1711   WBC  5.6   RBC  5.06   HGB  14.9   HCT  45.9   MCV  91   RDW  12.7   PLT  193   ALBUMIN  4.6   CRP  <2.9   BUN  21      Recent Labs   Lab Test  02/13/18   1711   TSH  2.32   T4  0.97     Hemoglobin    Date Value Ref Range Status   02/13/2018 14.9 13.3 - 17.7 g/dL Final     Urea Nitrogen   Date Value Ref Range Status   02/13/2018 21 7 - 30 mg/dL Final     Sed Rate   Date Value Ref Range Status   02/13/2018 7 0 - 15 mm/h Final     CRP Inflammation   Date Value Ref Range Status   02/13/2018 <2.9 0.0 - 8.0 mg/L Final     AST   Date Value Ref Range Status   02/13/2018 32 0 - 45 U/L Final     Albumin   Date Value Ref Range Status   02/13/2018 4.6 3.4 - 5.0 g/dL Final     Alkaline Phosphatase   Date Value Ref Range Status   02/13/2018 47 40 - 150 U/L Final     ALT   Date Value Ref Range Status   02/13/2018 41 0 - 70 U/L Final     Rheumatoid Factor   Date Value Ref Range Status   02/13/2018 <20 <20 IU/mL Final     Recent Labs   Lab Test 02/13/18  1711   WBC 5.6   HGB 14.9   HCT 45.9   MCV 91      BUN 21   TSH 2.32   AST 32   ALT 41   ALKPHOS 47       Reviewed Rheumatology lab flowsheet    Dina Rodriguez MD  [facility]  Department of Rheumatology & Autoimmune Disorders  [facility]: [phone]   Pager - [phone]

## 2019-07-05 NOTE — PATIENT INSTRUCTIONS
-- I will get specific serologies     -- You can ask your GI doctor about motility studies if they are not done.     -- For chronic abdominal pain if no other cause is found then see pain specialist.     -- RTC on as needed basis.

## 2019-07-07 LAB
CENTROMERE IGG SER-ACNC: <0.2 AI (ref 0–0.9)
ENA RNP IGG SER IA-ACNC: 0.2 AI (ref 0–0.9)
ENA SCL70 IGG SER IA-ACNC: 0.3 AI (ref 0–0.9)
ENA SM IGG SER-ACNC: <0.2 AI (ref 0–0.9)
ENA SS-A IGG SER IA-ACNC: <0.2 AI (ref 0–0.9)
ENA SS-B IGG SER IA-ACNC: <0.2 AI (ref 0–0.9)

## 2019-07-08 LAB
C3 SERPL-MCNC: 90 MG/DL (ref 76–169)
C4 SERPL-MCNC: 27 MG/DL (ref 15–50)
DSDNA AB SER-ACNC: 3 IU/ML
HCV AB SERPL QL IA: NONREACTIVE

## 2019-07-09 LAB
ALBUMIN SERPL ELPH-MCNC: 5 G/DL (ref 3.7–5.1)
ALPHA1 GLOB SERPL ELPH-MCNC: 0.4 G/DL (ref 0.2–0.4)
ALPHA2 GLOB SERPL ELPH-MCNC: 0.9 G/DL (ref 0.5–0.9)
B-GLOBULIN SERPL ELPH-MCNC: 0.9 G/DL (ref 0.6–1)
GAMMA GLOB SERPL ELPH-MCNC: 1.5 G/DL (ref 0.7–1.6)
M PROTEIN SERPL ELPH-MCNC: 0 G/DL
PROT PATTERN SERPL ELPH-IMP: NORMAL

## 2019-07-12 ASSESSMENT — ROUTINE ASSESSMENT OF PATIENT INDEX DATA (RAPID3)
TOTAL RAPID3 SCORE: 8.5
RAPID3 INTERPRETATION: MODERATE 6.1-12.0

## 2020-01-21 ENCOUNTER — TELEPHONE (OUTPATIENT)
Dept: RHEUMATOLOGY | Facility: CLINIC | Age: 40
End: 2020-01-21

## 2020-01-21 NOTE — TELEPHONE ENCOUNTER
[facility] Call Center    Phone Message    May a detailed message be left on voicemail: no    Reason for Call: Requesting Results   Name/type of test: lab  Date of test: 7.5.19  Was test done at a location other than [facility] (Please fill in the location if not [facility])?: No    Pt looking for lab results from 7.5.19. Please call.       Action Taken: Message routed to:  Adult Clinics: Rheumatology p [phone]

## 2020-01-22 NOTE — TELEPHONE ENCOUNTER
Called patient and discussed results. He expressed understanding and had no further questions.     MINISTERIO JonesN, RN   Rheumatology Care Coordinator   [facility]        ----- Message from Dina Rodriguez MD sent at 1/22/2020  9:38 AM CST -----  Hi Mr Wiley,     All your blood tests for autoimmune diseases like Lupus, Sjogren's, Scleroderma were negative. Inflammation markers are normal. Likelihood of autoimmune connective tissue disease as the cause of your symptoms is very low.     Dina Rodriguez MD

## 2022-10-06 NOTE — MR AVS SNAPSHOT
After Visit Summary   3/13/2018    Atilio Wiley    MRN: [id number]           Patient Information     Date Of Birth          1980        Visit Information        Provider Department      3/13/2018 7:00 AM Cirilo Rogers MD [facility]        Care Instructions    Thanks for coming today.  Ortho/Sports Medicine Clinic  [address] [place] [address]    To schedule future appointments in Ortho Clinic, you may call [phone].    To schedule ordered imaging by your provider:   Call Central Imaging [phone]    To schedule an injection ordered by your provider:  Call Central Imaging Injection scheduling line: [phone]  [employer] available online at:  [email or web address]    Please call if any further questions or concerns ([phone]).  Clinic hours 8 am to 5 pm.    Return to clinic (call) if symptoms worsen or fail to improve.          Follow-ups after your visit        Your next 10 appointments already scheduled     Mar 20, 2018   Procedure with William Charles Duane, MD   [facility] (--)    [address] [facility] [address] [phone]              Who to contact     If you have questions or need follow up information about today's clinic visit or your schedule please contact [facility] directly at [phone].  Normal or non-critical lab and imaging results will be communicated to you by MyChart, letter or phone within 4 business days after the clinic has received the results. If you do not hear from us within 7 days, please contact the clinic through MyChart or phone. If you have a critical or abnormal lab result, we will notify you by phone as soon as possible.  Submit refill requests through [employer] or call your pharmacy and they will forward the refill request to us. Please allow 3 business days for your refill to be completed.          Additional Information About Your Visit       "  MyChart Information     [employer] is an electronic gateway that provides easy, online access to your medical records. With [employer], you can request a clinic appointment, read your test results, renew a prescription or communicate with your care team.     To sign up for [employer] visit the website at [email or web address]   You will be asked to enter the access code listed below, as well as some personal information. Please follow the directions to create your username and password.     Your access code is: [email or web address]  Expires: 2018  5:46 PM     Your access code will  in 90 days. If you need help or a new code, please contact your [facility] or call [phone] for assistance.        Care EveryWhere ID     This is your Care EveryWhere ID. This could be used by other organizations to access your [place] medical records  [id number]        Your Vitals Were     Pulse Height BMI (Body Mass Index)             55 1.702 m (5' 7\") 22.71 kg/m2          Blood Pressure from Last 3 Encounters:   18 105/55   18 103/66   18 110/60    Weight from Last 3 Encounters:   18 65.8 kg (145 lb)   18 65.8 kg (145 lb)   18 65.8 kg (145 lb)              Today, you had the following     No orders found for display       Primary Care Provider Office Phone # Fax #    DAVID Monahan [facility] [phone] [address] [facility] [address]        Equal Access to Services     [facility]: Hadii aad ku hadasho Soomaali, waaxda luqadaha, qaybta kaalmada adeegyada, delisa dale . So [facility] [phone].    ATENCIÓN: Si habla español, tiene a farias disposición servicios gratuitos de asistencia lingüística. Llame al [phone].    We comply with applicable federal civil rights laws and [place] laws. We do not discriminate on the basis of race, color, national origin, age, disability, sex, sexual orientation, or gender " identity.            Thank you!     Thank you for choosing [facility]  for your care. Our goal is always to provide you with excellent care. Hearing back from our patients is one way we can continue to improve our services. Please take a few minutes to complete the written survey that you may receive in the mail after your visit with us. Thank you!             Your Updated Medication List - Protect others around you: Learn how to safely use, store and throw away your medicines at www.disposemymeds.org.          This list is accurate as of 3/13/18  7:48 AM.  Always use your most recent med list.                   Brand Name Dispense Instructions for use Diagnosis    sotalol 80 MG tablet    BETAPACE     Take 80 mg by mouth 2 times daily        ZANTAC PO      Reported on 5/3/2017           no concerns

## (undated) DEVICE — SOL WATER IRRIG 1000ML BOTTLE 07139-09

## (undated) RX ORDER — FENTANYL CITRATE 50 UG/ML
INJECTION, SOLUTION INTRAMUSCULAR; INTRAVENOUS
Status: DISPENSED
Start: 2018-03-20

## (undated) RX ORDER — SIMETHICONE 40MG/0.6ML
SUSPENSION, DROPS(FINAL DOSAGE FORM)(ML) ORAL
Status: DISPENSED
Start: 2018-03-20